# Patient Record
Sex: MALE | Race: WHITE | NOT HISPANIC OR LATINO | URBAN - METROPOLITAN AREA
[De-identification: names, ages, dates, MRNs, and addresses within clinical notes are randomized per-mention and may not be internally consistent; named-entity substitution may affect disease eponyms.]

---

## 2019-08-05 ENCOUNTER — OFFICE (AMBULATORY)
Dept: URBAN - METROPOLITAN AREA CLINIC 64 | Facility: CLINIC | Age: 74
End: 2019-08-05
Payer: COMMERCIAL

## 2019-08-05 VITALS
HEIGHT: 67 IN | WEIGHT: 185 LBS | SYSTOLIC BLOOD PRESSURE: 111 MMHG | HEART RATE: 77 BPM | DIASTOLIC BLOOD PRESSURE: 66 MMHG

## 2019-08-05 DIAGNOSIS — R10.13 EPIGASTRIC PAIN: ICD-10-CM

## 2019-08-05 DIAGNOSIS — R11.0 NAUSEA: ICD-10-CM

## 2019-08-05 DIAGNOSIS — Z86.010 PERSONAL HISTORY OF COLONIC POLYPS: ICD-10-CM

## 2019-08-05 DIAGNOSIS — R10.32 LEFT LOWER QUADRANT PAIN: ICD-10-CM

## 2019-08-05 PROCEDURE — 99204 OFFICE O/P NEW MOD 45 MIN: CPT | Performed by: INTERNAL MEDICINE

## 2019-08-28 ENCOUNTER — LAB REQUISITION (OUTPATIENT)
Dept: LAB | Facility: HOSPITAL | Age: 74
End: 2019-08-28

## 2019-08-28 ENCOUNTER — ON CAMPUS - OUTPATIENT (AMBULATORY)
Dept: URBAN - METROPOLITAN AREA HOSPITAL 2 | Facility: HOSPITAL | Age: 74
End: 2019-08-28
Payer: COMMERCIAL

## 2019-08-28 ENCOUNTER — OFFICE (AMBULATORY)
Dept: URBAN - METROPOLITAN AREA PATHOLOGY 4 | Facility: PATHOLOGY | Age: 74
End: 2019-08-28
Payer: COMMERCIAL

## 2019-08-28 ENCOUNTER — TRANSCRIBE ORDERS (OUTPATIENT)
Dept: ADMINISTRATIVE | Facility: HOSPITAL | Age: 74
End: 2019-08-28

## 2019-08-28 VITALS
DIASTOLIC BLOOD PRESSURE: 62 MMHG | RESPIRATION RATE: 16 BRPM | OXYGEN SATURATION: 99 % | HEART RATE: 81 BPM | HEART RATE: 85 BPM | DIASTOLIC BLOOD PRESSURE: 64 MMHG | SYSTOLIC BLOOD PRESSURE: 146 MMHG | OXYGEN SATURATION: 100 % | OXYGEN SATURATION: 96 % | OXYGEN SATURATION: 98 % | SYSTOLIC BLOOD PRESSURE: 136 MMHG | DIASTOLIC BLOOD PRESSURE: 66 MMHG | DIASTOLIC BLOOD PRESSURE: 82 MMHG | HEART RATE: 80 BPM | WEIGHT: 185 LBS | HEART RATE: 83 BPM | DIASTOLIC BLOOD PRESSURE: 87 MMHG | SYSTOLIC BLOOD PRESSURE: 120 MMHG | SYSTOLIC BLOOD PRESSURE: 149 MMHG | SYSTOLIC BLOOD PRESSURE: 128 MMHG | TEMPERATURE: 97.5 F | DIASTOLIC BLOOD PRESSURE: 71 MMHG | DIASTOLIC BLOOD PRESSURE: 96 MMHG | SYSTOLIC BLOOD PRESSURE: 127 MMHG | HEART RATE: 88 BPM | HEART RATE: 84 BPM | DIASTOLIC BLOOD PRESSURE: 78 MMHG | HEIGHT: 67 IN | SYSTOLIC BLOOD PRESSURE: 157 MMHG | OXYGEN SATURATION: 94 %

## 2019-08-28 DIAGNOSIS — D12.3 BENIGN NEOPLASM OF TRANSVERSE COLON: ICD-10-CM

## 2019-08-28 DIAGNOSIS — R10.13 EPIGASTRIC PAIN: ICD-10-CM

## 2019-08-28 DIAGNOSIS — R10.13 ABDOMINAL PAIN, EPIGASTRIC: Primary | ICD-10-CM

## 2019-08-28 DIAGNOSIS — R11.0 NAUSEA: ICD-10-CM

## 2019-08-28 DIAGNOSIS — Z86.010 PERSONAL HISTORY OF COLONIC POLYPS: ICD-10-CM

## 2019-08-28 DIAGNOSIS — Z86.010 HISTORY OF COLONIC POLYPS: ICD-10-CM

## 2019-08-28 LAB
GI HISTOLOGY: A. UNSPECIFIED: (no result)
GI HISTOLOGY: PDF REPORT: (no result)

## 2019-08-28 PROCEDURE — 88305 TISSUE EXAM BY PATHOLOGIST: CPT | Performed by: INTERNAL MEDICINE

## 2019-08-28 PROCEDURE — 88305 TISSUE EXAM BY PATHOLOGIST: CPT | Mod: 26 | Performed by: INTERNAL MEDICINE

## 2019-08-28 PROCEDURE — 45385 COLONOSCOPY W/LESION REMOVAL: CPT | Performed by: INTERNAL MEDICINE

## 2019-08-28 PROCEDURE — 43235 EGD DIAGNOSTIC BRUSH WASH: CPT | Performed by: INTERNAL MEDICINE

## 2019-08-29 LAB
LAB AP CASE REPORT: NORMAL
PATH REPORT.FINAL DX SPEC: NORMAL
PATH REPORT.GROSS SPEC: NORMAL

## 2019-09-04 ENCOUNTER — HOSPITAL ENCOUNTER (OUTPATIENT)
Dept: NUCLEAR MEDICINE | Facility: HOSPITAL | Age: 74
Discharge: HOME | End: 2019-09-04

## 2019-09-04 ENCOUNTER — HOSPITAL ENCOUNTER (OUTPATIENT)
Dept: ULTRASOUND IMAGING | Facility: HOSPITAL | Age: 74
Discharge: HOME | End: 2019-09-04
Admitting: INTERNAL MEDICINE

## 2019-09-04 DIAGNOSIS — R10.13 ABDOMINAL PAIN, EPIGASTRIC: ICD-10-CM

## 2019-09-04 DIAGNOSIS — R11.0 NAUSEA: ICD-10-CM

## 2019-09-04 PROCEDURE — 0 TECHNETIUM TC 99M MEBROFENIN KIT: Performed by: INTERNAL MEDICINE

## 2019-09-04 PROCEDURE — 78227 HEPATOBIL SYST IMAGE W/DRUG: CPT

## 2019-09-04 PROCEDURE — 76705 ECHO EXAM OF ABDOMEN: CPT

## 2019-09-04 PROCEDURE — A9537 TC99M MEBROFENIN: HCPCS | Performed by: INTERNAL MEDICINE

## 2019-09-04 RX ORDER — KIT FOR THE PREPARATION OF TECHNETIUM TC 99M MEBROFENIN 45 MG/10ML
1 INJECTION, POWDER, LYOPHILIZED, FOR SOLUTION INTRAVENOUS
Status: COMPLETED | OUTPATIENT
Start: 2019-09-04 | End: 2019-09-04

## 2019-09-04 RX ADMIN — MEBROFENIN 1 DOSE: 45 INJECTION, POWDER, LYOPHILIZED, FOR SOLUTION INTRAVENOUS at 07:30

## 2019-09-18 ENCOUNTER — OFFICE (AMBULATORY)
Dept: URBAN - METROPOLITAN AREA CLINIC 64 | Facility: CLINIC | Age: 74
End: 2019-09-18
Payer: COMMERCIAL

## 2019-09-18 VITALS
WEIGHT: 185 LBS | HEART RATE: 83 BPM | HEIGHT: 67 IN | DIASTOLIC BLOOD PRESSURE: 70 MMHG | SYSTOLIC BLOOD PRESSURE: 120 MMHG

## 2019-09-18 DIAGNOSIS — R10.32 LEFT LOWER QUADRANT PAIN: ICD-10-CM

## 2019-09-18 DIAGNOSIS — I10 ESSENTIAL (PRIMARY) HYPERTENSION: ICD-10-CM

## 2019-09-18 DIAGNOSIS — R11.0 NAUSEA: ICD-10-CM

## 2019-09-18 DIAGNOSIS — K59.00 CONSTIPATION, UNSPECIFIED: ICD-10-CM

## 2019-09-18 PROCEDURE — 99214 OFFICE O/P EST MOD 30 MIN: CPT | Performed by: INTERNAL MEDICINE

## 2019-09-18 RX ORDER — VITAMIN A 2400 MCG
500 CAPSULE ORAL
Qty: 60 | Refills: 0 | Status: ACTIVE
Start: 2019-09-18

## 2019-09-18 RX ORDER — PLECANATIDE 3 MG/1
3 TABLET ORAL
Qty: 90 | Refills: 3 | Status: ACTIVE
Start: 2019-09-18

## 2019-09-18 NOTE — SERVICEHPINOTES
ROMAN CALIX is a 74 year old male c hx of cad, chf, cabg and back surgery who is being seen in the office today for nausea as well as intermittent LLQ and epig pain. He was seen by Dr. Bailey and started on abx for presumed diverticulitis. Pain recurred and he had CT suggesting large ventral hernia. He denies wt loss or abd distension. Pain can be sharp and achy and of mod severity without radiation mainly in llq. He has hx of several L lower ribs removed in past prior to back surgery and notes abdominal bulging in the area as well. He was told he is not good candidate to have hernia repaired.  He tends towards constipation and takes fiber and linzess.Since last seen, he had egd/colon, US and HIDA with results below. He continues to have daily nausea and constipation with some episodes of diarrhea. He has early satiety. His wt is stable.BRSept 2019 HIDA 91% no symptomsBRSept 2019 US nl gallbladderBRAug 2019 EGD/colon nl EGD, polypBRJuly 2019 CT large ventral [id number] egd/colon EGD nl, colon TA polyps

## 2019-11-15 ENCOUNTER — OFFICE (AMBULATORY)
Dept: URBAN - METROPOLITAN AREA CLINIC 64 | Facility: CLINIC | Age: 74
End: 2019-11-15
Payer: COMMERCIAL

## 2019-11-15 VITALS
DIASTOLIC BLOOD PRESSURE: 62 MMHG | HEART RATE: 77 BPM | WEIGHT: 186 LBS | HEIGHT: 67 IN | SYSTOLIC BLOOD PRESSURE: 116 MMHG

## 2019-11-15 DIAGNOSIS — R11.0 NAUSEA: ICD-10-CM

## 2019-11-15 DIAGNOSIS — K59.00 CONSTIPATION, UNSPECIFIED: ICD-10-CM

## 2019-11-15 DIAGNOSIS — R10.32 LEFT LOWER QUADRANT PAIN: ICD-10-CM

## 2019-11-15 PROCEDURE — 99213 OFFICE O/P EST LOW 20 MIN: CPT | Performed by: INTERNAL MEDICINE

## 2019-11-15 RX ORDER — LINACLOTIDE 290 UG/1
290 CAPSULE, GELATIN COATED ORAL
Qty: 90 | Refills: 3 | Status: COMPLETED
Start: 2019-11-15 | End: 2020-02-20

## 2019-11-15 RX ORDER — PEPPERMINT OIL 90 MG
180 CAPSULE, DELAYED, AND EXTENDED RELEASE ORAL
Qty: 60 | Refills: 12 | Status: ACTIVE
Start: 2019-11-15

## 2020-02-10 ENCOUNTER — OFFICE (AMBULATORY)
Dept: URBAN - METROPOLITAN AREA CLINIC 64 | Facility: CLINIC | Age: 75
End: 2020-02-10
Payer: COMMERCIAL

## 2020-02-10 VITALS
WEIGHT: 185 LBS | HEIGHT: 67 IN | SYSTOLIC BLOOD PRESSURE: 121 MMHG | DIASTOLIC BLOOD PRESSURE: 61 MMHG | HEART RATE: 79 BPM

## 2020-02-10 DIAGNOSIS — K59.00 CONSTIPATION, UNSPECIFIED: ICD-10-CM

## 2020-02-10 DIAGNOSIS — R63.0 ANOREXIA: ICD-10-CM

## 2020-02-10 DIAGNOSIS — R19.4 CHANGE IN BOWEL HABIT: ICD-10-CM

## 2020-02-10 DIAGNOSIS — R11.0 NAUSEA: ICD-10-CM

## 2020-02-10 PROCEDURE — 99213 OFFICE O/P EST LOW 20 MIN: CPT | Performed by: INTERNAL MEDICINE

## 2020-02-10 RX ORDER — PRUCALOPRIDE 2 MG/1
2 TABLET, FILM COATED ORAL
Qty: 30 | Refills: 11 | Status: ACTIVE
Start: 2020-02-10

## 2021-06-21 ENCOUNTER — INPATIENT HOSPITAL (AMBULATORY)
Dept: URBAN - METROPOLITAN AREA HOSPITAL 76 | Facility: HOSPITAL | Age: 76
End: 2021-06-21

## 2021-06-21 DIAGNOSIS — K21.9 GASTRO-ESOPHAGEAL REFLUX DISEASE WITHOUT ESOPHAGITIS: ICD-10-CM

## 2021-06-21 DIAGNOSIS — K59.00 CONSTIPATION, UNSPECIFIED: ICD-10-CM

## 2021-06-21 DIAGNOSIS — R14.0 ABDOMINAL DISTENSION (GASEOUS): ICD-10-CM

## 2021-06-21 PROCEDURE — 99222 1ST HOSP IP/OBS MODERATE 55: CPT | Performed by: INTERNAL MEDICINE

## 2021-06-22 ENCOUNTER — INPATIENT HOSPITAL (AMBULATORY)
Dept: URBAN - METROPOLITAN AREA HOSPITAL 76 | Facility: HOSPITAL | Age: 76
End: 2021-06-22

## 2021-06-22 DIAGNOSIS — R14.0 ABDOMINAL DISTENSION (GASEOUS): ICD-10-CM

## 2021-06-22 DIAGNOSIS — K21.9 GASTRO-ESOPHAGEAL REFLUX DISEASE WITHOUT ESOPHAGITIS: ICD-10-CM

## 2021-06-22 DIAGNOSIS — K59.00 CONSTIPATION, UNSPECIFIED: ICD-10-CM

## 2021-06-22 PROCEDURE — 99232 SBSQ HOSP IP/OBS MODERATE 35: CPT | Performed by: NURSE PRACTITIONER

## 2021-12-07 ENCOUNTER — APPOINTMENT (OUTPATIENT)
Dept: GENERAL RADIOLOGY | Facility: HOSPITAL | Age: 76
End: 2021-12-07

## 2021-12-07 ENCOUNTER — APPOINTMENT (OUTPATIENT)
Dept: CARDIOLOGY | Facility: HOSPITAL | Age: 76
End: 2021-12-07

## 2021-12-07 ENCOUNTER — HOSPITAL ENCOUNTER (INPATIENT)
Facility: HOSPITAL | Age: 76
LOS: 2 days | Discharge: HOME | End: 2021-12-10
Attending: EMERGENCY MEDICINE | Admitting: HOSPITALIST

## 2021-12-07 DIAGNOSIS — E87.1 HYPONATREMIA: ICD-10-CM

## 2021-12-07 DIAGNOSIS — I25.10 CORONARY ARTERY DISEASE INVOLVING NATIVE CORONARY ARTERY OF NATIVE HEART, UNSPECIFIED WHETHER ANGINA PRESENT: ICD-10-CM

## 2021-12-07 DIAGNOSIS — I50.9 ACUTE ON CHRONIC CONGESTIVE HEART FAILURE, UNSPECIFIED HEART FAILURE TYPE (HCC): Primary | ICD-10-CM

## 2021-12-07 DIAGNOSIS — M79.604 RIGHT LEG PAIN: ICD-10-CM

## 2021-12-07 PROBLEM — M91.10 LEGG-PERTHES DISEASE: Status: ACTIVE | Noted: 2021-12-07

## 2021-12-07 PROBLEM — G89.4 CHRONIC PAIN DISORDER: Status: ACTIVE | Noted: 2017-05-11

## 2021-12-07 PROBLEM — N31.9 NEUROGENIC BLADDER: Status: ACTIVE | Noted: 2021-12-07

## 2021-12-07 PROBLEM — M25.559 HIP PAIN: Status: ACTIVE | Noted: 2018-02-22

## 2021-12-07 PROBLEM — N32.0 BLADDER OUTLET OBSTRUCTION: Status: ACTIVE | Noted: 2021-12-07

## 2021-12-07 PROBLEM — N41.1 CHRONIC PROSTATITIS: Status: ACTIVE | Noted: 2018-05-31

## 2021-12-07 PROBLEM — M19.90 ARTHRITIS: Status: ACTIVE | Noted: 2021-12-07

## 2021-12-07 PROBLEM — K21.9 GASTROESOPHAGEAL REFLUX DISEASE: Status: ACTIVE | Noted: 2017-05-11

## 2021-12-07 PROBLEM — IMO0002 HERNIATION OF INTERVERTEBRAL DISC: Status: ACTIVE | Noted: 2017-06-15

## 2021-12-07 PROBLEM — Z96.642 STATUS POST LEFT HIP REPLACEMENT: Status: ACTIVE | Noted: 2021-12-07

## 2021-12-07 PROBLEM — I10 BENIGN ESSENTIAL HYPERTENSION: Status: ACTIVE | Noted: 2017-05-11

## 2021-12-07 PROBLEM — N20.0 LEFT NEPHROLITHIASIS: Status: ACTIVE | Noted: 2018-06-27

## 2021-12-07 LAB
ALBUMIN SERPL-MCNC: 4 G/DL (ref 3.5–5.2)
ALBUMIN/GLOB SERPL: 1.1 G/DL
ALP SERPL-CCNC: 83 U/L (ref 39–117)
ALT SERPL W P-5'-P-CCNC: 20 U/L (ref 1–41)
ANION GAP SERPL CALCULATED.3IONS-SCNC: 9 MMOL/L (ref 5–15)
AST SERPL-CCNC: 65 U/L (ref 1–40)
BASOPHILS # BLD AUTO: 0 10*3/MM3 (ref 0–0.2)
BASOPHILS NFR BLD AUTO: 0.4 % (ref 0–1.5)
BH CV LOW VAS RIGHT LESSER SAPH VESSEL: 1
BH CV LOWER VASCULAR LEFT COMMON FEMORAL AUGMENT: NORMAL
BH CV LOWER VASCULAR LEFT COMMON FEMORAL COMPETENT: NORMAL
BH CV LOWER VASCULAR LEFT COMMON FEMORAL COMPRESS: NORMAL
BH CV LOWER VASCULAR LEFT COMMON FEMORAL PHASIC: NORMAL
BH CV LOWER VASCULAR LEFT COMMON FEMORAL SPONT: NORMAL
BH CV LOWER VASCULAR LEFT DISTAL FEMORAL COMPRESS: NORMAL
BH CV LOWER VASCULAR LEFT GASTRONEMIUS COMPRESS: NORMAL
BH CV LOWER VASCULAR LEFT GREATER SAPH AK COMPRESS: NORMAL
BH CV LOWER VASCULAR LEFT GREATER SAPH BK COMPRESS: NORMAL
BH CV LOWER VASCULAR LEFT LESSER SAPH COMPRESS: NORMAL
BH CV LOWER VASCULAR LEFT MID FEMORAL AUGMENT: NORMAL
BH CV LOWER VASCULAR LEFT MID FEMORAL COMPETENT: NORMAL
BH CV LOWER VASCULAR LEFT MID FEMORAL COMPRESS: NORMAL
BH CV LOWER VASCULAR LEFT MID FEMORAL PHASIC: NORMAL
BH CV LOWER VASCULAR LEFT MID FEMORAL SPONT: NORMAL
BH CV LOWER VASCULAR LEFT PERONEAL COMPRESS: NORMAL
BH CV LOWER VASCULAR LEFT POPLITEAL AUGMENT: NORMAL
BH CV LOWER VASCULAR LEFT POPLITEAL COMPETENT: NORMAL
BH CV LOWER VASCULAR LEFT POPLITEAL COMPRESS: NORMAL
BH CV LOWER VASCULAR LEFT POPLITEAL PHASIC: NORMAL
BH CV LOWER VASCULAR LEFT POPLITEAL SPONT: NORMAL
BH CV LOWER VASCULAR LEFT POSTERIOR TIBIAL COMPRESS: NORMAL
BH CV LOWER VASCULAR LEFT PROXIMAL FEMORAL COMPRESS: NORMAL
BH CV LOWER VASCULAR LEFT SAPHENOFEMORAL JUNCTION COMPRESS: NORMAL
BH CV LOWER VASCULAR RIGHT COMMON FEMORAL AUGMENT: NORMAL
BH CV LOWER VASCULAR RIGHT COMMON FEMORAL COMPETENT: NORMAL
BH CV LOWER VASCULAR RIGHT COMMON FEMORAL COMPRESS: NORMAL
BH CV LOWER VASCULAR RIGHT COMMON FEMORAL PHASIC: NORMAL
BH CV LOWER VASCULAR RIGHT COMMON FEMORAL SPONT: NORMAL
BH CV LOWER VASCULAR RIGHT DISTAL FEMORAL COMPRESS: NORMAL
BH CV LOWER VASCULAR RIGHT GASTRONEMIUS COMPRESS: NORMAL
BH CV LOWER VASCULAR RIGHT GREATER SAPH AK COMPRESS: NORMAL
BH CV LOWER VASCULAR RIGHT GREATER SAPH BK COMPRESS: NORMAL
BH CV LOWER VASCULAR RIGHT LESSER SAPH COMPRESS: NORMAL
BH CV LOWER VASCULAR RIGHT LESSER SAPH THROMBUS: NORMAL
BH CV LOWER VASCULAR RIGHT MID FEMORAL AUGMENT: NORMAL
BH CV LOWER VASCULAR RIGHT MID FEMORAL COMPETENT: NORMAL
BH CV LOWER VASCULAR RIGHT MID FEMORAL COMPRESS: NORMAL
BH CV LOWER VASCULAR RIGHT MID FEMORAL PHASIC: NORMAL
BH CV LOWER VASCULAR RIGHT MID FEMORAL SPONT: NORMAL
BH CV LOWER VASCULAR RIGHT PERONEAL COMPRESS: NORMAL
BH CV LOWER VASCULAR RIGHT POPLITEAL AUGMENT: NORMAL
BH CV LOWER VASCULAR RIGHT POPLITEAL COMPETENT: NORMAL
BH CV LOWER VASCULAR RIGHT POPLITEAL COMPRESS: NORMAL
BH CV LOWER VASCULAR RIGHT POPLITEAL PHASIC: NORMAL
BH CV LOWER VASCULAR RIGHT POPLITEAL SPONT: NORMAL
BH CV LOWER VASCULAR RIGHT POSTERIOR TIBIAL COMPRESS: NORMAL
BH CV LOWER VASCULAR RIGHT PROXIMAL FEMORAL COMPRESS: NORMAL
BH CV LOWER VASCULAR RIGHT SAPHENOFEMORAL JUNCTION COMPRESS: NORMAL
BILIRUB SERPL-MCNC: 0.4 MG/DL (ref 0–1.2)
BUN SERPL-MCNC: 17 MG/DL (ref 8–23)
BUN/CREAT SERPL: 23 (ref 7–25)
CALCIUM SPEC-SCNC: 8.7 MG/DL (ref 8.6–10.5)
CHLORIDE SERPL-SCNC: 79 MMOL/L (ref 98–107)
CHOLEST SERPL-MCNC: 137 MG/DL (ref 0–200)
CO2 SERPL-SCNC: 32 MMOL/L (ref 22–29)
CREAT SERPL-MCNC: 0.74 MG/DL (ref 0.76–1.27)
DEPRECATED RDW RBC AUTO: 42.9 FL (ref 37–54)
EOSINOPHIL # BLD AUTO: 0.3 10*3/MM3 (ref 0–0.4)
EOSINOPHIL NFR BLD AUTO: 3.6 % (ref 0.3–6.2)
ERYTHROCYTE [DISTWIDTH] IN BLOOD BY AUTOMATED COUNT: 14.8 % (ref 12.3–15.4)
GFR SERPL CREATININE-BSD FRML MDRD: 102 ML/MIN/1.73
GFR SERPL CREATININE-BSD FRML MDRD: 124 ML/MIN/1.73
GLOBULIN UR ELPH-MCNC: 3.5 GM/DL
GLUCOSE SERPL-MCNC: 118 MG/DL (ref 65–99)
HCT VFR BLD AUTO: 34.1 % (ref 37.5–51)
HDLC SERPL-MCNC: 44 MG/DL (ref 40–60)
HGB BLD-MCNC: 12.1 G/DL (ref 13–17.7)
HOLD SPECIMEN: NORMAL
HOLD SPECIMEN: NORMAL
LDLC SERPL CALC-MCNC: 65 MG/DL (ref 0–100)
LDLC/HDLC SERPL: 1.35 {RATIO}
LYMPHOCYTES # BLD AUTO: 1.2 10*3/MM3 (ref 0.7–3.1)
LYMPHOCYTES NFR BLD AUTO: 13.7 % (ref 19.6–45.3)
MAGNESIUM SERPL-MCNC: 2.1 MG/DL (ref 1.6–2.4)
MAXIMAL PREDICTED HEART RATE: 142 BPM
MCH RBC QN AUTO: 28.9 PG (ref 26.6–33)
MCHC RBC AUTO-ENTMCNC: 35.6 G/DL (ref 31.5–35.7)
MCV RBC AUTO: 81.2 FL (ref 79–97)
MONOCYTES # BLD AUTO: 0.6 10*3/MM3 (ref 0.1–0.9)
MONOCYTES NFR BLD AUTO: 6.9 % (ref 5–12)
NEUTROPHILS NFR BLD AUTO: 6.5 10*3/MM3 (ref 1.7–7)
NEUTROPHILS NFR BLD AUTO: 75.4 % (ref 42.7–76)
NRBC BLD AUTO-RTO: 0 /100 WBC (ref 0–0.2)
NT-PROBNP SERPL-MCNC: 492.9 PG/ML (ref 0–1800)
OSMOLALITY SERPL: 253 MOSM/KG (ref 280–301)
PLATELET # BLD AUTO: 172 10*3/MM3 (ref 140–450)
PMV BLD AUTO: 7.2 FL (ref 6–12)
POTASSIUM SERPL-SCNC: 4.7 MMOL/L (ref 3.5–5.2)
PROT SERPL-MCNC: 7.5 G/DL (ref 6–8.5)
RBC # BLD AUTO: 4.19 10*6/MM3 (ref 4.14–5.8)
SODIUM SERPL-SCNC: 120 MMOL/L (ref 136–145)
STRESS TARGET HR: 121 BPM
TRIGL SERPL-MCNC: 167 MG/DL (ref 0–150)
TROPONIN T SERPL-MCNC: 0.01 NG/ML (ref 0–0.03)
VLDLC SERPL-MCNC: 28 MG/DL (ref 5–40)
WBC NRBC COR # BLD: 8.6 10*3/MM3 (ref 3.4–10.8)
WHOLE BLOOD HOLD SPECIMEN: NORMAL

## 2021-12-07 PROCEDURE — 71045 X-RAY EXAM CHEST 1 VIEW: CPT

## 2021-12-07 PROCEDURE — 80053 COMPREHEN METABOLIC PANEL: CPT | Performed by: EMERGENCY MEDICINE

## 2021-12-07 PROCEDURE — 25010000002 ONDANSETRON PER 1 MG: Performed by: EMERGENCY MEDICINE

## 2021-12-07 PROCEDURE — 85025 COMPLETE CBC W/AUTO DIFF WBC: CPT | Performed by: EMERGENCY MEDICINE

## 2021-12-07 PROCEDURE — 80061 LIPID PANEL: CPT | Performed by: NURSE PRACTITIONER

## 2021-12-07 PROCEDURE — 93970 EXTREMITY STUDY: CPT

## 2021-12-07 PROCEDURE — 25010000002 MORPHINE PER 10 MG: Performed by: EMERGENCY MEDICINE

## 2021-12-07 PROCEDURE — 99285 EMERGENCY DEPT VISIT HI MDM: CPT

## 2021-12-07 PROCEDURE — 83735 ASSAY OF MAGNESIUM: CPT | Performed by: EMERGENCY MEDICINE

## 2021-12-07 PROCEDURE — 84484 ASSAY OF TROPONIN QUANT: CPT | Performed by: EMERGENCY MEDICINE

## 2021-12-07 PROCEDURE — 83930 ASSAY OF BLOOD OSMOLALITY: CPT | Performed by: NURSE PRACTITIONER

## 2021-12-07 PROCEDURE — 93005 ELECTROCARDIOGRAM TRACING: CPT | Performed by: EMERGENCY MEDICINE

## 2021-12-07 PROCEDURE — 99222 1ST HOSP IP/OBS MODERATE 55: CPT | Performed by: NURSE PRACTITIONER

## 2021-12-07 PROCEDURE — G0378 HOSPITAL OBSERVATION PER HR: HCPCS

## 2021-12-07 PROCEDURE — 83880 ASSAY OF NATRIURETIC PEPTIDE: CPT | Performed by: EMERGENCY MEDICINE

## 2021-12-07 RX ORDER — SODIUM CHLORIDE 0.9 % (FLUSH) 0.9 %
10 SYRINGE (ML) INJECTION EVERY 12 HOURS SCHEDULED
Status: DISCONTINUED | OUTPATIENT
Start: 2021-12-07 | End: 2021-12-10 | Stop reason: HOSPADM

## 2021-12-07 RX ORDER — ACETAMINOPHEN 650 MG/1
650 SUPPOSITORY RECTAL EVERY 4 HOURS PRN
Status: DISCONTINUED | OUTPATIENT
Start: 2021-12-07 | End: 2021-12-10 | Stop reason: HOSPADM

## 2021-12-07 RX ORDER — ONDANSETRON 2 MG/ML
4 INJECTION INTRAMUSCULAR; INTRAVENOUS EVERY 6 HOURS PRN
Status: DISCONTINUED | OUTPATIENT
Start: 2021-12-07 | End: 2021-12-10 | Stop reason: HOSPADM

## 2021-12-07 RX ORDER — SODIUM CHLORIDE 9 MG/ML
125 INJECTION, SOLUTION INTRAVENOUS CONTINUOUS
Status: DISCONTINUED | OUTPATIENT
Start: 2021-12-08 | End: 2021-12-08

## 2021-12-07 RX ORDER — MAGNESIUM SULFATE 1 G/100ML
1 INJECTION INTRAVENOUS AS NEEDED
Status: DISCONTINUED | OUTPATIENT
Start: 2021-12-07 | End: 2021-12-10 | Stop reason: HOSPADM

## 2021-12-07 RX ORDER — POTASSIUM CHLORIDE 20 MEQ/1
40 TABLET, EXTENDED RELEASE ORAL AS NEEDED
Status: DISCONTINUED | OUTPATIENT
Start: 2021-12-07 | End: 2021-12-10 | Stop reason: HOSPADM

## 2021-12-07 RX ORDER — MORPHINE SULFATE 4 MG/ML
4 INJECTION, SOLUTION INTRAMUSCULAR; INTRAVENOUS ONCE
Status: COMPLETED | OUTPATIENT
Start: 2021-12-07 | End: 2021-12-07

## 2021-12-07 RX ORDER — ALUMINA, MAGNESIA, AND SIMETHICONE 2400; 2400; 240 MG/30ML; MG/30ML; MG/30ML
15 SUSPENSION ORAL EVERY 6 HOURS PRN
Status: DISCONTINUED | OUTPATIENT
Start: 2021-12-07 | End: 2021-12-10 | Stop reason: HOSPADM

## 2021-12-07 RX ORDER — SODIUM CHLORIDE 0.9 % (FLUSH) 0.9 %
10 SYRINGE (ML) INJECTION AS NEEDED
Status: DISCONTINUED | OUTPATIENT
Start: 2021-12-07 | End: 2021-12-10 | Stop reason: HOSPADM

## 2021-12-07 RX ORDER — POTASSIUM CHLORIDE 1.5 G/1.77G
40 POWDER, FOR SOLUTION ORAL AS NEEDED
Status: DISCONTINUED | OUTPATIENT
Start: 2021-12-07 | End: 2021-12-10 | Stop reason: HOSPADM

## 2021-12-07 RX ORDER — SODIUM CHLORIDE 9 MG/ML
50 INJECTION, SOLUTION INTRAVENOUS CONTINUOUS
Status: DISCONTINUED | OUTPATIENT
Start: 2021-12-07 | End: 2021-12-07

## 2021-12-07 RX ORDER — CHOLECALCIFEROL (VITAMIN D3) 125 MCG
5 CAPSULE ORAL NIGHTLY PRN
Status: DISCONTINUED | OUTPATIENT
Start: 2021-12-07 | End: 2021-12-10 | Stop reason: HOSPADM

## 2021-12-07 RX ORDER — ONDANSETRON 2 MG/ML
4 INJECTION INTRAMUSCULAR; INTRAVENOUS ONCE
Status: COMPLETED | OUTPATIENT
Start: 2021-12-07 | End: 2021-12-07

## 2021-12-07 RX ORDER — ONDANSETRON 4 MG/1
4 TABLET, FILM COATED ORAL EVERY 6 HOURS PRN
Status: DISCONTINUED | OUTPATIENT
Start: 2021-12-07 | End: 2021-12-10 | Stop reason: HOSPADM

## 2021-12-07 RX ORDER — NITROGLYCERIN 0.4 MG/1
0.4 TABLET SUBLINGUAL
Status: DISCONTINUED | OUTPATIENT
Start: 2021-12-07 | End: 2021-12-10 | Stop reason: HOSPADM

## 2021-12-07 RX ORDER — MAGNESIUM SULFATE HEPTAHYDRATE 40 MG/ML
2 INJECTION, SOLUTION INTRAVENOUS AS NEEDED
Status: DISCONTINUED | OUTPATIENT
Start: 2021-12-07 | End: 2021-12-10 | Stop reason: HOSPADM

## 2021-12-07 RX ORDER — BUMETANIDE 0.25 MG/ML
2 INJECTION INTRAMUSCULAR; INTRAVENOUS ONCE
Status: COMPLETED | OUTPATIENT
Start: 2021-12-07 | End: 2021-12-07

## 2021-12-07 RX ORDER — ACETAMINOPHEN 325 MG/1
650 TABLET ORAL EVERY 4 HOURS PRN
Status: DISCONTINUED | OUTPATIENT
Start: 2021-12-07 | End: 2021-12-10 | Stop reason: HOSPADM

## 2021-12-07 RX ORDER — VASOPRESSIN 20 U/ML
1 INJECTION PARENTERAL ONCE
Status: DISCONTINUED | OUTPATIENT
Start: 2021-12-07 | End: 2021-12-07

## 2021-12-07 RX ORDER — ACETAMINOPHEN 160 MG/5ML
650 SOLUTION ORAL EVERY 4 HOURS PRN
Status: DISCONTINUED | OUTPATIENT
Start: 2021-12-07 | End: 2021-12-10 | Stop reason: HOSPADM

## 2021-12-07 RX ORDER — CALCIUM CARBONATE 200(500)MG
2 TABLET,CHEWABLE ORAL 2 TIMES DAILY PRN
Status: DISCONTINUED | OUTPATIENT
Start: 2021-12-07 | End: 2021-12-10 | Stop reason: HOSPADM

## 2021-12-07 RX ADMIN — NITROGLYCERIN 1 INCH: 20 OINTMENT TOPICAL at 19:51

## 2021-12-07 RX ADMIN — ONDANSETRON 4 MG: 2 INJECTION INTRAMUSCULAR; INTRAVENOUS at 18:50

## 2021-12-07 RX ADMIN — BUMETANIDE 2 MG: 0.25 INJECTION, SOLUTION INTRAMUSCULAR; INTRAVENOUS at 19:52

## 2021-12-07 RX ADMIN — MORPHINE SULFATE 4 MG: 4 INJECTION INTRAVENOUS at 18:50

## 2021-12-07 RX ADMIN — MORPHINE SULFATE 4 MG: 4 INJECTION INTRAVENOUS at 22:07

## 2021-12-08 ENCOUNTER — APPOINTMENT (OUTPATIENT)
Dept: CARDIOLOGY | Facility: HOSPITAL | Age: 76
End: 2021-12-08

## 2021-12-08 PROBLEM — E87.1 HYPONATREMIA: Status: ACTIVE | Noted: 2021-12-08

## 2021-12-08 PROBLEM — M79.604 RIGHT LEG PAIN: Status: ACTIVE | Noted: 2021-12-08

## 2021-12-08 LAB
ANION GAP SERPL CALCULATED.3IONS-SCNC: 6 MMOL/L (ref 5–15)
ANION GAP SERPL CALCULATED.3IONS-SCNC: 6 MMOL/L (ref 5–15)
BASOPHILS # BLD AUTO: 0 10*3/MM3 (ref 0–0.2)
BASOPHILS NFR BLD AUTO: 0.2 % (ref 0–1.5)
BUN SERPL-MCNC: 12 MG/DL (ref 8–23)
BUN SERPL-MCNC: 15 MG/DL (ref 8–23)
BUN/CREAT SERPL: 16.4 (ref 7–25)
BUN/CREAT SERPL: 20.3 (ref 7–25)
CALCIUM SPEC-SCNC: 8.1 MG/DL (ref 8.6–10.5)
CALCIUM SPEC-SCNC: 8.4 MG/DL (ref 8.6–10.5)
CHLORIDE SERPL-SCNC: 86 MMOL/L (ref 98–107)
CHLORIDE SERPL-SCNC: 88 MMOL/L (ref 98–107)
CHLORIDE UR-SCNC: <20 MMOL/L
CO2 SERPL-SCNC: 32 MMOL/L (ref 22–29)
CO2 SERPL-SCNC: 33 MMOL/L (ref 22–29)
CREAT SERPL-MCNC: 0.73 MG/DL (ref 0.76–1.27)
CREAT SERPL-MCNC: 0.74 MG/DL (ref 0.76–1.27)
DEPRECATED RDW RBC AUTO: 42.4 FL (ref 37–54)
EOSINOPHIL # BLD AUTO: 0.2 10*3/MM3 (ref 0–0.4)
EOSINOPHIL NFR BLD AUTO: 2.9 % (ref 0.3–6.2)
ERYTHROCYTE [DISTWIDTH] IN BLOOD BY AUTOMATED COUNT: 14.7 % (ref 12.3–15.4)
GFR SERPL CREATININE-BSD FRML MDRD: 102 ML/MIN/1.73
GFR SERPL CREATININE-BSD FRML MDRD: 104 ML/MIN/1.73
GFR SERPL CREATININE-BSD FRML MDRD: 124 ML/MIN/1.73
GFR SERPL CREATININE-BSD FRML MDRD: 126 ML/MIN/1.73
GLUCOSE SERPL-MCNC: 122 MG/DL (ref 65–99)
GLUCOSE SERPL-MCNC: 99 MG/DL (ref 65–99)
HBA1C MFR BLD: 5.8 % (ref 3.5–5.6)
HCT VFR BLD AUTO: 33.7 % (ref 37.5–51)
HGB BLD-MCNC: 11.8 G/DL (ref 13–17.7)
LYMPHOCYTES # BLD AUTO: 0.9 10*3/MM3 (ref 0.7–3.1)
LYMPHOCYTES NFR BLD AUTO: 13.8 % (ref 19.6–45.3)
MAGNESIUM SERPL-MCNC: 2.2 MG/DL (ref 1.6–2.4)
MCH RBC QN AUTO: 28.7 PG (ref 26.6–33)
MCHC RBC AUTO-ENTMCNC: 35 G/DL (ref 31.5–35.7)
MCV RBC AUTO: 82 FL (ref 79–97)
MONOCYTES # BLD AUTO: 0.5 10*3/MM3 (ref 0.1–0.9)
MONOCYTES NFR BLD AUTO: 8 % (ref 5–12)
NEUTROPHILS NFR BLD AUTO: 5.1 10*3/MM3 (ref 1.7–7)
NEUTROPHILS NFR BLD AUTO: 75.1 % (ref 42.7–76)
NRBC BLD AUTO-RTO: 0 /100 WBC (ref 0–0.2)
NT-PROBNP SERPL-MCNC: 574.5 PG/ML (ref 0–1800)
NT-PROBNP SERPL-MCNC: 936.4 PG/ML (ref 0–1800)
OSMOLALITY UR: 137 MOSM/KG (ref 300–800)
PLATELET # BLD AUTO: 150 10*3/MM3 (ref 140–450)
PMV BLD AUTO: 7 FL (ref 6–12)
POTASSIUM SERPL-SCNC: 4.7 MMOL/L (ref 3.5–5.2)
POTASSIUM SERPL-SCNC: 4.8 MMOL/L (ref 3.5–5.2)
POTASSIUM UR-SCNC: 14.4 MMOL/L
RBC # BLD AUTO: 4.11 10*6/MM3 (ref 4.14–5.8)
SARS-COV-2 RNA PNL SPEC NAA+PROBE: NOT DETECTED
SODIUM SERPL-SCNC: 125 MMOL/L (ref 136–145)
SODIUM SERPL-SCNC: 126 MMOL/L (ref 136–145)
SODIUM UR-SCNC: <20 MMOL/L
TROPONIN T SERPL-MCNC: 0.01 NG/ML (ref 0–0.03)
WBC NRBC COR # BLD: 6.8 10*3/MM3 (ref 3.4–10.8)

## 2021-12-08 PROCEDURE — 83735 ASSAY OF MAGNESIUM: CPT | Performed by: NURSE PRACTITIONER

## 2021-12-08 PROCEDURE — 83036 HEMOGLOBIN GLYCOSYLATED A1C: CPT | Performed by: NURSE PRACTITIONER

## 2021-12-08 PROCEDURE — 36415 COLL VENOUS BLD VENIPUNCTURE: CPT | Performed by: NURSE PRACTITIONER

## 2021-12-08 PROCEDURE — 0 MORPHINE SULFATE 4 MG/ML SOLUTION: Performed by: NURSE PRACTITIONER

## 2021-12-08 PROCEDURE — 84300 ASSAY OF URINE SODIUM: CPT | Performed by: EMERGENCY MEDICINE

## 2021-12-08 PROCEDURE — 97162 PT EVAL MOD COMPLEX 30 MIN: CPT

## 2021-12-08 PROCEDURE — 83935 ASSAY OF URINE OSMOLALITY: CPT | Performed by: NURSE PRACTITIONER

## 2021-12-08 PROCEDURE — 84588 ASSAY OF VASOPRESSIN: CPT | Performed by: NURSE PRACTITIONER

## 2021-12-08 PROCEDURE — 99999 PR OFFICE/OUTPT VISIT,PROCEDURE ONLY: CPT | Performed by: INTERNAL MEDICINE

## 2021-12-08 PROCEDURE — 97166 OT EVAL MOD COMPLEX 45 MIN: CPT

## 2021-12-08 PROCEDURE — 83880 ASSAY OF NATRIURETIC PEPTIDE: CPT | Performed by: NURSE PRACTITIONER

## 2021-12-08 PROCEDURE — 99232 SBSQ HOSP IP/OBS MODERATE 35: CPT | Performed by: HOSPITALIST

## 2021-12-08 PROCEDURE — 80048 BASIC METABOLIC PNL TOTAL CA: CPT | Performed by: NURSE PRACTITIONER

## 2021-12-08 PROCEDURE — 99222 1ST HOSP IP/OBS MODERATE 55: CPT | Performed by: INTERNAL MEDICINE

## 2021-12-08 PROCEDURE — 85025 COMPLETE CBC W/AUTO DIFF WBC: CPT | Performed by: NURSE PRACTITIONER

## 2021-12-08 PROCEDURE — 25010000002 SULFUR HEXAFLUORIDE MICROSPH 60.7-25 MG RECONSTITUTED SUSPENSION: Performed by: HOSPITALIST

## 2021-12-08 PROCEDURE — 25010000002 ENOXAPARIN PER 10 MG: Performed by: NURSE PRACTITIONER

## 2021-12-08 PROCEDURE — 93306 TTE W/DOPPLER COMPLETE: CPT | Performed by: INTERNAL MEDICINE

## 2021-12-08 PROCEDURE — 84133 ASSAY OF URINE POTASSIUM: CPT | Performed by: EMERGENCY MEDICINE

## 2021-12-08 PROCEDURE — 82436 ASSAY OF URINE CHLORIDE: CPT | Performed by: EMERGENCY MEDICINE

## 2021-12-08 PROCEDURE — U0003 INFECTIOUS AGENT DETECTION BY NUCLEIC ACID (DNA OR RNA); SEVERE ACUTE RESPIRATORY SYNDROME CORONAVIRUS 2 (SARS-COV-2) (CORONAVIRUS DISEASE [COVID-19]), AMPLIFIED PROBE TECHNIQUE, MAKING USE OF HIGH THROUGHPUT TECHNOLOGIES AS DESCRIBED BY CMS-2020-01-R: HCPCS | Performed by: NURSE PRACTITIONER

## 2021-12-08 PROCEDURE — 93306 TTE W/DOPPLER COMPLETE: CPT

## 2021-12-08 PROCEDURE — U0005 INFEC AGEN DETEC AMPLI PROBE: HCPCS | Performed by: NURSE PRACTITIONER

## 2021-12-08 PROCEDURE — 84484 ASSAY OF TROPONIN QUANT: CPT | Performed by: NURSE PRACTITIONER

## 2021-12-08 RX ORDER — CLOPIDOGREL BISULFATE 75 MG/1
75 TABLET ORAL DAILY
Status: CANCELLED | OUTPATIENT
Start: 2021-12-09

## 2021-12-08 RX ORDER — FUROSEMIDE 40 MG/1
20 TABLET ORAL NIGHTLY PRN
COMMUNITY

## 2021-12-08 RX ORDER — BUMETANIDE 0.25 MG/ML
1 INJECTION INTRAMUSCULAR; INTRAVENOUS
Status: DISCONTINUED | OUTPATIENT
Start: 2021-12-08 | End: 2021-12-09

## 2021-12-08 RX ORDER — ONDANSETRON 4 MG/1
4 TABLET, FILM COATED ORAL EVERY 8 HOURS PRN
COMMUNITY

## 2021-12-08 RX ORDER — METOLAZONE 5 MG/1
2.5 TABLET ORAL 3 TIMES WEEKLY
COMMUNITY
End: 2021-12-10 | Stop reason: HOSPADM

## 2021-12-08 RX ORDER — POLYETHYLENE GLYCOL 3350 17 G/17G
17 POWDER, FOR SOLUTION ORAL 3 TIMES DAILY PRN
COMMUNITY

## 2021-12-08 RX ORDER — ALBUTEROL SULFATE 2.5 MG/3ML
2.5 SOLUTION RESPIRATORY (INHALATION) EVERY 8 HOURS PRN
COMMUNITY

## 2021-12-08 RX ORDER — ATENOLOL 25 MG/1
12.5 TABLET ORAL DAILY
COMMUNITY

## 2021-12-08 RX ORDER — FUROSEMIDE 40 MG/1
40 TABLET ORAL 2 TIMES DAILY
Status: ON HOLD | COMMUNITY
End: 2021-12-08

## 2021-12-08 RX ORDER — MORPHINE SULFATE 4 MG/ML
4 INJECTION, SOLUTION INTRAMUSCULAR; INTRAVENOUS ONCE
Status: COMPLETED | OUTPATIENT
Start: 2021-12-08 | End: 2021-12-08

## 2021-12-08 RX ORDER — OXYCODONE AND ACETAMINOPHEN 10; 325 MG/1; MG/1
1 TABLET ORAL 4 TIMES DAILY
Status: ON HOLD | COMMUNITY
End: 2021-12-10 | Stop reason: SDUPTHER

## 2021-12-08 RX ORDER — ROSUVASTATIN CALCIUM 10 MG/1
10 TABLET, COATED ORAL NIGHTLY
Status: DISCONTINUED | OUTPATIENT
Start: 2021-12-08 | End: 2021-12-10 | Stop reason: HOSPADM

## 2021-12-08 RX ORDER — FUROSEMIDE 40 MG/1
40 TABLET ORAL EVERY MORNING
COMMUNITY

## 2021-12-08 RX ORDER — POLYETHYLENE GLYCOL 3350 17 G/17G
17 POWDER, FOR SOLUTION ORAL 3 TIMES DAILY PRN
Status: CANCELLED | OUTPATIENT
Start: 2021-12-08

## 2021-12-08 RX ORDER — GUAIFENESIN 600 MG/1
1200 TABLET, EXTENDED RELEASE ORAL 2 TIMES DAILY
Status: ON HOLD | COMMUNITY
End: 2021-12-08

## 2021-12-08 RX ORDER — BUDESONIDE AND FORMOTEROL FUMARATE DIHYDRATE 160; 4.5 UG/1; UG/1
2 AEROSOL RESPIRATORY (INHALATION) NIGHTLY
COMMUNITY

## 2021-12-08 RX ORDER — ROSUVASTATIN CALCIUM 10 MG/1
10 TABLET, FILM COATED ORAL DAILY
COMMUNITY

## 2021-12-08 RX ORDER — CYCLOBENZAPRINE HCL 10 MG
5 TABLET ORAL 3 TIMES DAILY PRN
Status: CANCELLED | OUTPATIENT
Start: 2021-12-08

## 2021-12-08 RX ORDER — SENNA PLUS 8.6 MG/1
2 TABLET ORAL 2 TIMES DAILY
COMMUNITY

## 2021-12-08 RX ORDER — METOLAZONE 2.5 MG/1
2.5 TABLET ORAL 3 TIMES WEEKLY
Status: CANCELLED | OUTPATIENT
Start: 2021-12-08

## 2021-12-08 RX ORDER — ALBUTEROL SULFATE 2.5 MG/3ML
2.5 SOLUTION RESPIRATORY (INHALATION) EVERY 8 HOURS PRN
Status: CANCELLED | OUTPATIENT
Start: 2021-12-08

## 2021-12-08 RX ORDER — SENNA PLUS 8.6 MG/1
2 TABLET ORAL 2 TIMES DAILY
Status: CANCELLED | OUTPATIENT
Start: 2021-12-08

## 2021-12-08 RX ORDER — ROSUVASTATIN CALCIUM 10 MG/1
10 TABLET, COATED ORAL NIGHTLY
Status: DISCONTINUED | OUTPATIENT
Start: 2021-12-08 | End: 2021-12-08

## 2021-12-08 RX ORDER — BUDESONIDE AND FORMOTEROL FUMARATE DIHYDRATE 160; 4.5 UG/1; UG/1
2 AEROSOL RESPIRATORY (INHALATION) NIGHTLY
Status: CANCELLED | OUTPATIENT
Start: 2021-12-08

## 2021-12-08 RX ORDER — ISOSORBIDE MONONITRATE 60 MG/1
60 TABLET, EXTENDED RELEASE ORAL DAILY
COMMUNITY

## 2021-12-08 RX ORDER — ATENOLOL 25 MG/1
12.5 TABLET ORAL DAILY
Status: CANCELLED | OUTPATIENT
Start: 2021-12-09

## 2021-12-08 RX ORDER — MORPHINE SULFATE 4 MG/ML
2 INJECTION, SOLUTION INTRAMUSCULAR; INTRAVENOUS EVERY 4 HOURS PRN
Status: DISPENSED | OUTPATIENT
Start: 2021-12-08 | End: 2021-12-10

## 2021-12-08 RX ORDER — ISOSORBIDE MONONITRATE 60 MG/1
60 TABLET, EXTENDED RELEASE ORAL DAILY
Status: CANCELLED | OUTPATIENT
Start: 2021-12-09

## 2021-12-08 RX ORDER — CYCLOBENZAPRINE HCL 5 MG
5 TABLET ORAL 3 TIMES DAILY PRN
COMMUNITY

## 2021-12-08 RX ORDER — LISINOPRIL 2.5 MG/1
2.5 TABLET ORAL DAILY
Status: ON HOLD | COMMUNITY
End: 2021-12-08

## 2021-12-08 RX ORDER — MULTIPLE VITAMINS W/ MINERALS TAB 9MG-400MCG
1 TAB ORAL DAILY
Status: CANCELLED | OUTPATIENT
Start: 2021-12-09

## 2021-12-08 RX ORDER — OXYCODONE HYDROCHLORIDE 5 MG/1
10 TABLET ORAL ONCE
Status: COMPLETED | OUTPATIENT
Start: 2021-12-08 | End: 2021-12-08

## 2021-12-08 RX ORDER — CLOPIDOGREL BISULFATE 75 MG/1
75 TABLET ORAL DAILY
COMMUNITY

## 2021-12-08 RX ORDER — PANTOPRAZOLE SODIUM 40 MG/1
40 TABLET, DELAYED RELEASE ORAL DAILY
COMMUNITY

## 2021-12-08 RX ORDER — ROSUVASTATIN CALCIUM 10 MG/1
10 TABLET, COATED ORAL DAILY
Status: CANCELLED | OUTPATIENT
Start: 2021-12-09

## 2021-12-08 RX ORDER — MULTIPLE VITAMINS W/ MINERALS TAB 9MG-400MCG
1 TAB ORAL DAILY
COMMUNITY

## 2021-12-08 RX ORDER — PANTOPRAZOLE SODIUM 40 MG/1
40 TABLET, DELAYED RELEASE ORAL DAILY
Status: CANCELLED | OUTPATIENT
Start: 2021-12-09

## 2021-12-08 RX ADMIN — SODIUM CHLORIDE 500 ML: 9 INJECTION, SOLUTION INTRAVENOUS at 00:49

## 2021-12-08 RX ADMIN — ENOXAPARIN SODIUM 40 MG: 40 INJECTION SUBCUTANEOUS at 00:49

## 2021-12-08 RX ADMIN — MORPHINE SULFATE 2 MG: 4 INJECTION INTRAVENOUS at 19:47

## 2021-12-08 RX ADMIN — OXYCODONE 10 MG: 5 TABLET ORAL at 00:49

## 2021-12-08 RX ADMIN — BUMETANIDE 1 MG: 0.25 INJECTION INTRAMUSCULAR; INTRAVENOUS at 21:17

## 2021-12-08 RX ADMIN — ROSUVASTATIN CALCIUM 10 MG: 10 TABLET, FILM COATED ORAL at 21:17

## 2021-12-08 RX ADMIN — MORPHINE SULFATE 2 MG: 4 INJECTION INTRAVENOUS at 23:40

## 2021-12-08 RX ADMIN — ENOXAPARIN SODIUM 40 MG: 40 INJECTION SUBCUTANEOUS at 15:35

## 2021-12-08 RX ADMIN — SODIUM CHLORIDE, PRESERVATIVE FREE 10 ML: 5 INJECTION INTRAVENOUS at 00:49

## 2021-12-08 RX ADMIN — MORPHINE SULFATE 4 MG: 4 INJECTION INTRAVENOUS at 00:49

## 2021-12-08 RX ADMIN — SODIUM CHLORIDE, PRESERVATIVE FREE 10 ML: 5 INJECTION INTRAVENOUS at 21:17

## 2021-12-08 RX ADMIN — MORPHINE SULFATE 2 MG: 4 INJECTION INTRAVENOUS at 15:35

## 2021-12-08 RX ADMIN — MORPHINE SULFATE 2 MG: 4 INJECTION INTRAVENOUS at 11:15

## 2021-12-08 RX ADMIN — SODIUM CHLORIDE 125 ML/HR: 9 INJECTION, SOLUTION INTRAVENOUS at 00:50

## 2021-12-08 RX ADMIN — SULFUR HEXAFLUORIDE 2 ML: KIT at 07:27

## 2021-12-08 NOTE — ED PROVIDER NOTES
Subjective   76-year-old male complaining of leg swelling and orthopnea.  He states he has had a lot of right-sided radicular type pain and some muscle spasm.  He reports that he has been nauseated and short of breath but denies chest pain other than a chest tightness when he takes a deep breath.  He denies fever or chills.  He reports he took an extra water pill recently because he did have some increased swelling in both legs.  He states he has only had radicular pain on the right.  He reports no night sweats or hemoptysis          Review of Systems   Constitutional: Positive for fatigue. Negative for chills and fever.   Eyes: Negative for visual disturbance.   Respiratory: Positive for chest tightness and shortness of breath. Negative for wheezing and stridor.    Cardiovascular: Positive for leg swelling. Negative for palpitations.   Neurological: Positive for dizziness, weakness and numbness. Negative for tremors, seizures, syncope, facial asymmetry, speech difficulty, light-headedness and headaches.   All other systems reviewed and are negative.      No past medical history on file.  Patient has history of coronary artery disease with previous coronary artery bypass grafting.  The patient has a history of a COPD, hypertension, gastroesophageal reflux disease  Allergies   Allergen Reactions   • Sulfamethoxazole-Trimethoprim Rash   • Morphine And Related GI Intolerance     Nausea  Nausea         No past surgical history on file.    No family history on file.    Social History     Socioeconomic History   • Marital status: Unknown     States that he lives independently    Objective   Physical Exam  Alert Nico Coma Scale 15   HEENT: Pupils equal and reactive to light. Conjunctivae are not injected. normal tympanic membranes. Oropharynx and nares are normal.   Neck: Supple. Midline trachea. No JVD. No goiter.   Chest: Bibasilar rales are noted and equal breath sounds bilaterally regular rate and rhythm without  murmur or rub.  There is no S3   Abdomen: Positive bowel sounds nontender nondistended. No rebound or peritoneal signs. No CVA tenderness.   Extremities no clubbing cyanosis 2+ leg edema is noted motor sensory exam is normal the full range of motion is intact   skin: Warm and dry, no rashes or petechia.   Lymphatic: No regional lymphadenopathy.  The patient has bilateral calf pain but equivocally negative Homans' sign  Procedures           ED Course                 Labs Reviewed   COMPREHENSIVE METABOLIC PANEL - Abnormal; Notable for the following components:       Result Value    Glucose 118 (*)     Creatinine 0.74 (*)     Sodium 120 (*)     Chloride 79 (*)     CO2 32.0 (*)     AST (SGOT) 65 (*)     All other components within normal limits    Narrative:     GFR Normal >60  Chronic Kidney Disease <60  Kidney Failure <15     CBC WITH AUTO DIFFERENTIAL - Abnormal; Notable for the following components:    Hemoglobin 12.1 (*)     Hematocrit 34.1 (*)     Lymphocyte % 13.7 (*)     All other components within normal limits   BNP (IN-HOUSE) - Normal    Narrative:     Among patients with dyspnea, NT-proBNP is highly sensitive for the detection of acute congestive heart failure. In addition NT-proBNP of <300 pg/ml effectively rules out acute congestive heart failure with 99% negative predictive value.    Results may be falsely decreased if patient taking Biotin.     TROPONIN (IN-HOUSE) - Normal    Narrative:     Troponin T Reference Range:  <= 0.03 ng/mL-   Negative for AMI  >0.03 ng/mL-     Abnormal for myocardial necrosis.  Clinicians would have to utilize clinical acumen, EKG, Troponin and serial changes to determine if it is an Acute Myocardial Infarction or myocardial injury due to an underlying chronic condition.       Results may be falsely decreased if patient taking Biotin.     MAGNESIUM - Normal   CBC AND DIFFERENTIAL    Narrative:     The following orders were created for panel order CBC & Differential.  Procedure                                Abnormality         Status                     ---------                               -----------         ------                     CBC Auto Differential[[id number]]        Abnormal            Final result                 Please view results for these tests on the individual orders.   EXTRA TUBES    Narrative:     The following orders were created for panel order Extra Tubes.  Procedure                               Abnormality         Status                     ---------                               -----------         ------                     Gold Top - SST[[id number]]                                   Final result               Green Top (Gel)[[id number]]                                  Final result               Light Blue Top[[id number]]                                   Final result                 Please view results for these tests on the individual orders.   GOLD TOP - SST   GREEN TOP   LIGHT BLUE TOP     Medications   Morphine sulfate (PF) injection 4 mg (has no administration in time range)   nitroglycerin (NITROSTAT) ointment 1 inch (1 inch Topical Given 12/7/21 1951)   bumetanide (BUMEX) injection 2 mg (2 mg Intravenous Given 12/7/21 1952)   ondansetron (ZOFRAN) injection 4 mg (4 mg Intravenous Given 12/7/21 1850)   Morphine sulfate (PF) injection 4 mg (4 mg Intravenous Given 12/7/21 1850)     XR Chest 1 View    Result Date: 12/7/2021  1.Cardiomegaly. 2.Some vascular congestion not excluded. 3.Density left basilar area that could relate to some atelectasis.  Electronically Signed By-Scar Paul MD On:12/7/2021 7:35 PM This report was finalized on [id number] by  Scar Paul MD.                       Been a report of bilateral lower extremity venous Doppler duplex test shows old superficial venous thrombosis on the right with no acute DVT.  Lots of interstitial edema was noted                MDM  Number of Diagnoses or Management Options     Amount and/or Complexity  of Data Reviewed  Clinical lab tests: reviewed  Tests in the radiology section of CPT®: reviewed  Tests in the medicine section of CPT®: reviewed    Risk of Complications, Morbidity, and/or Mortality  Presenting problems: high  Diagnostic procedures: high  Management options: high  General comments: Pain was controlled in the ER patient overall said he felt better with emergency department therapy.  The patient's case was discussed with the hospitalist service.  The patient will be admitted for correction of hyponatremia as well as treatment of congestive heart failure.  The patient was agreeable to this plan of treatment        Final diagnoses:   Acute on chronic congestive heart failure, unspecified heart failure type (HCC)   Hyponatremia   Coronary artery disease involving native coronary artery of native heart, unspecified whether angina present       ED Disposition  ED Disposition     ED Disposition Condition Comment    Decision to Admit            No follow-up provider specified.       Medication List      No changes were made to your prescriptions during this visit.          Bronson Damian MD  12/07/21 [id number]

## 2021-12-08 NOTE — PLAN OF CARE
"Goal Outcome Evaluation:  Plan of Care Reviewed With: patient           Outcome Summary: Pt is 76 male with PMHx of CABG, CHF, back surgery, hypertension who presents with c/o BLE edema and RLE pain. Dx with acute-on-chronic CHF, hyponatremia. Venous doppler RLE (-) acute changes. 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 At baseline, pt reports residing with spouse in a one-level home with 2-3 JULIANA. He typically ambulates with RW, but due to increased weakness has been relying on w/c. Spouse in rehab currently, and states daughter and other friends stopping in to assist with IADLs. Pt lethargic upon OT / PT arrival, and is questionable historian. Pt oriented to person, time, and \"hospital\", though does believe he is at a different local hospital initially. Pt aware he is lethargic and states, \"I just can't wake up\" when attempting to complete cognitive assessment. Bed mobility Max A 1x2. Sits EOB approximately 5 minutes, requiring Min-Mod A for balance. Unable to attempt standing tolerance due to cognitive impairments / impaired balance. Pt appears far below stated baseline, and will benefit from IP rehab at discharge.  " Done 7/9/19

## 2021-12-08 NOTE — CONSULTS
INITIAL CONSULT NOTE      Patient Name: Jacky Judd  : 1945  MRN: [id number]  Primary Care Physician: Reuben Puga MD  Date of admission: 2021    Patient Care Team:  Reuben Puga MD as PCP - General  Amairani Chopra DO (Family Medicine)        Reason for Consult:        hyponatremia  Subjective   History of Present Illness:   Chief Complaint:   Chief Complaint   Patient presents with   • Leg Pain     HISTORY:  Jacky Judd is a 76 y.o. male with past medical history of coronary artery bypass graft, congestive heart failure, back surgery, hypertension, bilateral lower extremity edema in the past, was seen by a primary care provider recently has significant edema and swelling of the lower extremities and shortness of breath. Started on metolazone*. who presents with increasing cramps of the lower extremities increased weakness unable to stand up found to have sodium of 120 at the time of admission. Chest x-ray did show some congestion in the both lungs. BNP level was elevated chest x-ray did show cardiomegaly with some vascular congestion still has bilateral lower extremity edema. 2 mg of Bumex given yesterday with some improvement in the urine output and sodium level is getting better. Serum sodium now 126 with urine sodium of 120.          Review of systems:  All other review of system unremarkable  Constitutional: No fever, no chills, no lethargy, no weakness.  HEENT:  No headache, otalgia, itchy eyes, nasal discharge or sore throat.  Cardiac:  No chest pain, dyspnea, orthopnea or PND.  Chest:              No cough, phlegm or wheezing.  Abdomen:  No abdominal pain, nausea or vomiting.  Neuro:  No focal weakness, abnormal movements orseizure like activity.  :   No hematuria, no pyuria, no dysuria, no flank pain.  ROS was otherwise negative except as mentioned in the [place].       Personal History:     Past Medical History:   Past Medical History:   Diagnosis Date   • CHF (congestive  heart failure) ([facility]ly [facility])    • COPD (chronic obstructive pulmonary disease) ([facility]ly [facility])    • Coronary artery disease    • GERD (gastroesophageal reflux disease)    • Hypertension        Surgical History:      Past Surgical History:   Procedure Laterality Date   • CARDIAC SURGERY         Family History: family history is not on file. Otherwise pertinent FHx was reviewed and unremarkable.     Social History:  reports that he has never smoked. He does not have any smokeless tobacco history on file. He reports that he does not drink alcohol and does not use drugs.    Medications:  Prior to Admission medications    Medication Sig Start Date End Date Taking? Authorizing Provider   albuterol (PROVENTIL) (2.5 MG/3ML) 0.083% nebulizer solution Take 2.5 mg by nebulization Every 8 (Eight) Hours As Needed for Wheezing.   Yes Edwardo Pineda MD   atenolol (TENORMIN) 25 MG tablet Take 12.5 mg by mouth Daily.   Yes Edwardo Pineda MD   budesonide-formoterol (SYMBICORT) 160-4.5 MCG/ACT inhaler Inhale 2 puffs Every Night.   Yes Edwardo Pineda MD   Cholecalciferol (Vitamin D3) 25 MCG (1000 UT) capsule Take 1,000 Units by mouth Daily.   Yes Edwardo Pineda MD   clopidogrel (PLAVIX) 75 MG tablet Take 75 mg by mouth Daily.   Yes Edwardo Pineda MD   Crestor 10 MG tablet Take 10 mg by mouth Daily.   Yes Edwardo Pineda MD   cyclobenzaprine (FLEXERIL) 5 MG tablet Take 5 mg by mouth 3 (Three) Times a Day As Needed for Muscle Spasms.   Yes Edwardo Pineda MD   Fluticasone Furoate-Vilanterol (Breo Ellipta) 100-25 MCG/INH inhaler Inhale 1 puff Daily.   Yes Edwardo Pineda MD   isosorbide mononitrate (IMDUR) 60 MG 24 hr tablet Take 60 mg by mouth Daily.   Yes Edwardo Pineda MD   metOLazone (ZAROXOLYN) 5 MG tablet Take 2.5 mg by mouth Daily.   Yes Edwardo Pineda MD   multivitamin with minerals tablet tablet Take 1 tablet by mouth Daily.   Yes Edwardo Pineda MD   NON FORMULARY 4 (Four)  Times a Day As Needed. Rikki/Ailyn lotion compound   Yes Edwardo Pineda MD   ondansetron (ZOFRAN) 4 MG tablet Take 4 mg by mouth Every 8 (Eight) Hours As Needed for Nausea or Vomiting.   Yes Edwardo Pineda MD   oxyCODONE-acetaminophen (PERCOCET)  MG per tablet Take 1 tablet by mouth 4 (Four) Times a Day.   Yes Edwardo Pineda MD   pantoprazole (PROTONIX) 40 MG EC tablet Take 40 mg by mouth Daily.   Yes Edwardo Pineda MD   polyethylene glycol (MiraLax) 17 g packet Take 17 g by mouth 3 (Three) Times a Day As Needed.   Yes Edwardo Pineda MD   senna (senna) 8.6 MG tablet Take 2 tablets by mouth 2 (Two) Times a Day.   Yes Edwardo Pineda MD   Diclofenac Sodium (VOLTAREN) 1 % gel gel Apply 4 g topically to the appropriate area as directed 4 (Four) Times a Day As Needed.  12/8/21  Edwardo Pineda MD   Fluticasone-Umeclidin-Vilant (Trelegy Ellipta) 100-62.5-25 MCG/INH inhaler Inhale 1 puff Daily.  12/8/21  Edwardo Pineda MD   furosemide (LASIX) 40 MG tablet Take 40 mg by mouth 2 (Two) Times a Day.  12/8/21  Edwardo Pineda MD   guaiFENesin (MUCINEX) 600 MG 12 hr tablet Take 1,200 mg by mouth 2 (Two) Times a Day.  12/8/21  Edwardo Pineda MD   lisinopril (PRINIVIL,ZESTRIL) 2.5 MG tablet Take 2.5 mg by mouth Daily.  12/8/21  Edwardo Pineda MD     Scheduled Meds:bumetanide, 1 mg, Intravenous, Q12H  enoxaparin, 40 mg, Subcutaneous, Daily  sodium chloride, 10 mL, Intravenous, Q12H      Continuous Infusions:   PRN Meds:•  acetaminophen **OR** acetaminophen **OR** acetaminophen  •  aluminum-magnesium hydroxide-simethicone  •  calcium carbonate  •  magnesium sulfate **OR** magnesium sulfate in D5W 1g/100mL (PREMIX)  •  melatonin  •  Morphine  •  nitroglycerin  •  ondansetron **OR** ondansetron  •  potassium chloride  •  potassium chloride  •  sodium chloride  Allergies:    Allergies   Allergen Reactions   • Sulfamethoxazole-Trimethoprim Rash   • Morphine  And Related GI Intolerance     Nausea  Nausea         Objective   Exam:     Vital Signs  Temp:  [97.7 °F (36.5 °C)-98.2 °F (36.8 °C)] 98.2 °F (36.8 °C)  Heart Rate:  [] 80  Resp:  [16-17] 17  BP: ()/(53-71) 135/70  SpO2:  [87 %-98 %] 98 %  on  Flow (L/min):  [2] 2;   Device (Oxygen Therapy): nasal cannula  Body mass index is 28.19 kg/m².  EXAM  General: Elderly obese  male in no acute distress.    Head:      Normocephalic and atraumatic.    Eyes:      PERRL/EOM intact, conjunctivae and sclerae clear without nystagmus.    Neck:      No masses, thyromegaly,  trachea central   Lungs:    Clear bilaterally to auscultation.    Heart:      Regular rate and rhythm, no murmur no gallop  Abd:        Soft, nontender, not distended, bowel sounds positive, no shifting dullness.  Msk:        No deformity or scoliosis noted of thoracic or lumbar spine.    Pulses:   Pulses normal in all 4 extremities.    Extremities:        No cyanosis or clubbing--+2 edema.    Neuro:    No focal deficits.   alert oriented x3  Skin:       Intact without lesions or rashes.    Psych:    Alert and cooperative; normal mood and affect; normal attention span       Results Review:  I have personally reviewed most recent Data :  [facility]  CBC    Results from last 7 days   Lab Units 12/08/21  0609 12/07/21  1846   WBC 10*3/mm3 6.80 8.60   HEMOGLOBIN g/dL 11.8* 12.1*   PLATELETS 10*3/mm3 150 172     CMP   Results from last 7 days   Lab Units 12/08/21  1636 12/08/21  0609 12/07/21  1846   SODIUM mmol/L 126* 125* 120*   POTASSIUM mmol/L 4.8 4.7 4.7   CHLORIDE mmol/L 88* 86* 79*   CO2 mmol/L 32.0* 33.0* 32.0*   BUN mg/dL 12 15 17   CREATININE mg/dL 0.73* 0.74* 0.74*   GLUCOSE mg/dL 122* 99 118*   ALBUMIN g/dL  --   --  4.00   BILIRUBIN mg/dL  --   --  0.4   ALK PHOS U/L  --   --  83   AST (SGOT) U/L  --   --  65*   ALT (SGPT) U/L  --   --  20     ABG      XR Chest 1 View    Result Date: 12/7/2021  1.Cardiomegaly. 2.Some vascular  congestion not excluded. 3.Density left basilar area that could relate to some atelectasis.  Electronically Signed By-Scar Paul MD On:12/7/2021 7:35 PM This report was finalized on [id number] by  Scar Paul MD.            Assessment/Plan   Assessment and Plan:         Hyponatremia    Acute on chronic congestive heart failure (HCC)    Benign essential hypertension    Bladder outlet obstruction    CHF (congestive heart failure) (HCC)    Coronary arteriosclerosis in native artery    Gastroesophageal reflux disease    Hip pain    Hypercholesterolemia    Low back pain    S/P CABG x 4    Right leg pain    ASSESSMENT:  • Hyponatremia: Likely etiology use of thiazide diuretics in the presence of some underlying lung condition may be some ADH effect.  • History of coronary artery disease with coronary artery bypass graft done  • Congestive heart failure echocardiogram results pending  • History of hypertension  • History of prostate issues        Plan:     • At this time continue loop diuretics that will help with underlying volume condition and also going to help with the sodium.  • So far creatinine is stable.  • Follow-up with repeat labs tomorrow morning  • As urine sodium is less than 20 I do think thiazide diuretics most likely cause significant hyponatremia  • Thank you for letting me participate  • We will try to manage with loop diuretics and if needed depending upon the echocardiogram Aldactone can be considered avoid thiazide diuretics in the future    Note started  by Barber Barton MD,   [facility] kidney consultant  [phone]

## 2021-12-08 NOTE — THERAPY EVALUATION
Patient Name: Jacky Judd  : 1945    MRN: [id number]                              Today's Date: 2021       Admit Date: 2021    Visit Dx:     ICD-10-CM ICD-9-CM   1. Acute on chronic congestive heart failure, unspecified heart failure type ([facility])  I50.9 428.0   2. Hyponatremia  E87.1 276.1   3. Coronary artery disease involving native coronary artery of native heart, unspecified whether angina present  I25.10 414.01     Patient Active Problem List   Diagnosis   • Acute on chronic congestive heart failure ([facility])   • Arthritis   • Benign essential hypertension   • Bladder outlet obstruction   • CHF (congestive heart failure) ([facility])   • Chronic pain disorder   • Chronic prostatitis   • Coronary arteriosclerosis in native artery   • Gastroesophageal reflux disease   • Herniation of intervertebral disc   • Hip pain   • Hypercholesterolemia   • Left nephrolithiasis   • Legg-Perthes disease   • Low back pain   • Neurogenic bladder   • Presence of stent in left circumflex coronary artery   • Recurrent urinary tract infection   • S/P CABG x 4   • Status post left hip replacement   • Umbilical hernia without obstruction and without gangrene   • Hyponatremia   • Right leg pain     Past Medical History:   Diagnosis Date   • CHF (congestive heart failure) ([facility])    • COPD (chronic obstructive pulmonary disease) ([facility])    • Coronary artery disease    • GERD (gastroesophageal reflux disease)    • Hypertension      Past Surgical History:   Procedure Laterality Date   • CARDIAC SURGERY        General Information     Row Name 21          OT Time and Intention    Document Type evaluation  -ES     Mode of Treatment occupational therapy  -ES     Row Name 21          General Information    Prior Level of Function independent:; ADL's  Questionable historian  -ES     Existing Precautions/Restrictions fall  -ES     Barriers to Rehab medically complex  -ES     Row Name 21 09          Occupational  "Profile    Reason for Services/Referral (Occupational Profile) Pt is 76 male with PMHx of CABG, CHF, back surgery, hypertension who presents with c/o BLE edema and RLE pain. Dx with acute-on-chronic CHF, hyponatremia. Venous doppler RLE (-) acute changes. 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 At baseline, pt reports residing with spouse in a one-level home with 2-3 JULIANA. He typically ambulates with RW, but due to increased weakness has been relying on w/c. Spouse in rehab currently, and states daughter and other friends stopping in to assist with IADLs. Pt lethargic upon OT / PT arrival, and is questionable historian.  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  -ES     Row Name 12/08/21 0923          Safety Issues, Functional Mobility    Impairments Affecting Function (Mobility) balance; cognition; coordination; motor control; motor planning; muscle tone abnormal; pain; postural/trunk control; range of motion (ROM)  -ES     Cognitive Impairments, Mobility Safety/Performance attention; insight into deficits/self-awareness; awareness, need for assistance; problem-solving/reasoning; safety precaution awareness; safety precaution follow-through  -ES           User Key  (r) = Recorded By, (t) = Taken By, (c) = Cosigned By    Initials Name Provider Type    ES Susana Dao OT Occupational Therapist                 Mobility/ADL's     Row Name " 12/08/21 1037          Bed Mobility    Bed Mobility bed mobility (all) activities  -ES     All Activities, Bath (Bed Mobility) maximum assist (25% patient effort); 2 person assist  -ES     Comment (Bed Mobility) Max A x 1-2 secondary to limited hip flexion, RLE pain, and difficulty following verbal / visual / tactile cues.  -ES     Row Name 12/08/21 1037          Transfers    Comment (Transfers) Unable to attempt secondary to difficulty maintaining balance EOB.  -ES     Row Name 12/08/21 1037          Activities of Daily Living    BADL Assessment/Intervention upper body dressing; lower body dressing  -ES     Row Name 12/08/21 1037          Upper Body Dressing Assessment/Training    Bath Level (Upper Body Dressing) moderate assist (50% patient effort)  -ES     Row Name 12/08/21 1037          Lower Body Dressing Assessment/Training    Bath Level (Lower Body Dressing) dependent (less than 25% patient effort)  -ES           User Key  (r) = Recorded By, (t) = Taken By, (c) = Cosigned By    Initials Name Provider Type     Susana Dao OT Occupational Therapist               Obj/Interventions     Row Name 12/08/21 1042          Sensory Assessment (Somatosensory)    Sensory Assessment (Somatosensory) other (see comments)  states chronic neuropathy  -ES     Row Name 12/08/21 1042          Vision Assessment/Intervention    Visual Impairment/Limitations corrective lenses full-time  -ES     Row Name 12/08/21 1042          Range of Motion Comprehensive    General Range of Motion bilateral upper extremity ROM WFL  -ES     Comment, General Range of Motion BUE ROM. Assessment limited due to cognitive limitations.  -ES     Row Name 12/08/21 1042          Strength Comprehensive (MMT)    Comment, General Manual Muscle Testing (MMT) Assessment Grossly 3/5 within ROM. Limited by cognitive assessment.  -ES     Row Name 12/08/21 1042          Motor Skills    Motor Skills coordination; neuro-muscular  function; posture  -ES     Row Name 12/08/21 1042          Balance    Balance Assessment sitting static balance; sitting dynamic balance  -ES     Static Sitting Balance mild impairment  -ES     Dynamic Sitting Balance mild impairment; moderate impairment  -ES     Balance Interventions sitting  -ES           User Key  (r) = Recorded By, (t) = Taken By, (c) = Cosigned By    Initials Name Provider Type    Susana Mclaughlin OT Occupational Therapist               Goals/Plan     Row Name 12/08/21 1102          Bathing Goal 1 (OT)    Activity/Device (Bathing Goal 1, OT) bathing skills, all  -ES     [place] Level/Cues Needed (Bathing Goal 1, OT) moderate assist (50-74% patient effort)  -ES     Time Frame (Bathing Goal 1, OT) 2 weeks  -ES     Row Name 12/08/21 1102          Dressing Goal 1 (OT)    Activity/Device (Dressing Goal 1, OT) lower body dressing  -ES     [place]/Cues Needed (Dressing Goal 1, OT) moderate assist (50-74% patient effort)  -ES     Time Frame (Dressing Goal 1, OT) 2 weeks  -ES     Row Name 12/08/21 1102          Toileting Goal 1 (OT)    Activity/Device (Toileting Goal 1, OT) toileting skills, all  -ES     [place] Level/Cues Needed (Toileting Goal 1, OT) moderate assist (50-74% patient effort)  -ES     Time Frame (Toileting Goal 1, OT) 2 weeks  -ES     Row Name 12/08/21 1102          Therapy Assessment/Plan (OT)    Planned Therapy Interventions (OT) activity tolerance training; BADL retraining; functional balance retraining; neuromuscular control/coordination retraining; occupation/activity based interventions; passive ROM/stretching; patient/caregiver education/training; ROM/therapeutic exercise; strengthening exercise  -ES           User Key  (r) = Recorded By, (t) = Taken By, (c) = Cosigned By    Initials Name Provider Type    Susana Mclaughlin OT Occupational Therapist               Clinical Impression     Row Name 12/08/21 1056          Pain Assessment    Additional  "Documentation Pain Scale: FACES Pre/Post-Treatment (Group)  -ES     Row Name 12/08/21 1056          Pain Scale: FACES Pre/Post-Treatment    Pain: FACES Scale, Pretreatment 4-->hurts little more  -ES     Posttreatment Pain Rating 6-->hurts even more  -ES     Pain Location - Side Right  -ES     Pain Location - Orientation lower  -ES     Pain Location back; extremity  -ES     Row Name 12/08/21 1056          Plan of Care Review    Plan of Care Reviewed With patient  -ES     Outcome Summary Pt is 76 male with PMHx of CABG, CHF, back surgery, hypertension who presents with c/o BLE edema and RLE pain. Dx with acute-on-chronic CHF, hyponatremia. Venous doppler RLE (-) acute changes. 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 At baseline, pt reports residing with spouse in a one-level home with 2-3 JULIANA. He typically ambulates with RW, but due to increased weakness has been relying on w/c. Spouse in rehab currently, and states daughter and other friends stopping in to assist with IADLs. Pt lethargic upon OT / PT arrival, and is questionable historian. Pt oriented to person, time, and \"hospital\", though does believe he is at a different local hospital initially. Pt aware he is lethargic and states, \"I just can't wake up\" when attempting to complete cognitive assessment. Bed mobility Max A 1x2. Sits EOB approximately 5 minutes, requiring Min-Mod A for balance. Unable to attempt standing tolerance due to cognitive impairments / impaired balance. Pt appears far below stated baseline, and will benefit from IP rehab at discharge.  -ES     Row Name 12/08/21 1056          Therapy Assessment/Plan (OT)    Rehab Potential (OT) good, to achieve stated therapy goals  -ES     Criteria for Skilled Therapeutic Interventions Met (OT) yes  -ES     Therapy Frequency (OT) 5 times/wk  -ES     Row Name 12/08/21 1056          Therapy Plan Review/Discharge Plan (OT)    Anticipated Discharge Disposition (OT) inpatient rehabilitation " facility  -ES     Row Name 12/08/21 1056          Vital Signs    Pre Systolic BP Rehab 123  -ES     Pre Treatment Diastolic BP 42  -ES     Intra Systolic BP Rehab 120  -ES     Intra Treatment Diastolic BP 60  -ES     Post Systolic BP Rehab 106  -ES     Post Treatment Diastolic BP 60  -ES     Pretreatment Heart Rate (beats/min) 100  -ES     Intratreatment Heart Rate (beats/min) 107  -ES     Posttreatment Heart Rate (beats/min) 105  -ES     Pre SpO2 (%) 96  -ES     O2 Delivery Pre Treatment supplemental O2  1L  -ES     Intra SpO2 (%) 88  -ES     O2 Delivery Intra Treatment supplemental O2  -ES     Post SpO2 (%) 95  -ES     O2 Delivery Post Treatment supplemental O2  -ES     Pre Patient Position Supine  -ES     Intra Patient Position Sitting  -ES     Post Patient Position Supine  -ES     Row Name 12/08/21 1056          Positioning and Restraints    Pre-Treatment Position in bed  -ES     Post Treatment Position bed  -ES     In Bed notified nsg; call light within reach; encouraged to call for assist; exit alarm on  -ES           User Key  (r) = Recorded By, (t) = Taken By, (c) = Cosigned By    Initials Name Provider Type    Susana Mclaughlin OT Occupational Therapist               Outcome Measures     Row Name 12/08/21 1103          How much help from another is currently needed...    Putting on and taking off regular lower body clothing? 1  -ES     Bathing (including washing, rinsing, and drying) 2  -ES     Toileting (which includes using toilet bed pan or urinal) 2  -ES     Putting on and taking off regular upper body clothing 2  -ES     Taking care of personal grooming (such as brushing teeth) 2  -ES     Eating meals 3  -ES     AM-PAC 6 Clicks Score (OT) 12  -ES     Row Name 12/08/21 1103          Functional Assessment    Outcome Measure Options AM-PAC 6 Clicks Daily Activity (OT)  -ES           User Key  (r) = Recorded By, (t) = Taken By, (c) = Cosigned By    Initials Name Provider Type    Susana Mclaughlin  "OT Occupational Therapist                  OT Recommendation and Plan  Planned Therapy Interventions (OT): activity tolerance training, BADL retraining, functional balance retraining, neuromuscular control/coordination retraining, occupation/activity based interventions, passive ROM/stretching, patient/caregiver education/training, ROM/therapeutic exercise, strengthening exercise  Therapy Frequency (OT): 5 times/wk  Plan of Care Review  Plan of Care Reviewed With: patient  Outcome Summary: Pt is 76 male with PMHx of CABG, CHF, back surgery, hypertension who presents with c/o BLE edema and RLE pain. Dx with acute-on-chronic CHF, hyponatremia. Venous doppler RLE (-) acute changes. 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 At baseline, pt reports residing with spouse in a one-level home with 2-3 JULIANA. He typically ambulates with RW, but due to increased weakness has been relying on w/c. Spouse in rehab currently, and states daughter and other friends stopping in to assist with IADLs. Pt lethargic upon OT / PT arrival, and is questionable historian. Pt oriented to person, time, and \"hospital\", though does believe he is at a different local hospital initially. Pt aware he is lethargic and states, \"I just can't wake up\" when attempting to complete cognitive assessment. Bed mobility Max A 1x2. Sits EOB approximately 5 minutes, requiring Min-Mod A for balance. Unable to attempt standing tolerance due to cognitive impairments / impaired balance. Pt appears far below stated baseline, and will benefit from IP rehab at discharge.     Time Calculation:    Time Calculation- OT     Row Name 12/08/21 1104             Time Calculation- OT    OT Start Time 0923  -ES      OT Stop Time 0934  -ES      OT Time Calculation (min) 11 min  -ES      Total Timed Code Minutes- OT 0 minute(s)  -ES      OT Received On 12/08/21  -ES      OT - Next Appointment 12/09/21  -ES      OT Goal Re-Cert Due Date 12/22/21  -ES            " User Key  (r) = Recorded By, (t) = Taken By, (c) = Cosigned By    Initials Name Provider Type     Susana Dao OT Occupational Therapist              Therapy Charges for Today     Code Description Service Date Service Provider Modifiers Qty    [id number]  OT EVAL MOD COMPLEXITY 3 12/8/2021 Susana Dao OT GO 1               Susana Dao OT  12/8/2021

## 2021-12-08 NOTE — PLAN OF CARE
Goal Outcome Evaluation:  Plan of Care Reviewed With: patient           Outcome Summary: Pt is a 77 YO M admitted with with BLE edema, and RLE pain. Pt states he lives at home with spouse, however she is currently in rehab. Pt typically independent with ADLs and ambulation, but has recently been transferring self to w/c. Pt states last few days he has been unable to transfer self. Pt this date requires MAX A x2 to come to sitting EOB and assistance to maintain seated balance. Pt with slight confusion, and attributes to lethargy. Pt with decreased sitting tolerance and requested to be returned to supine after approx 5 min. Pt is well below functional baseline and will require IP rehab following d/c.

## 2021-12-08 NOTE — PROGRESS NOTES
[facility] Medicine Services Daily Progress Note    Patient Name: Jacky Judd  : 1945  MRN: [id number]  Primary Care Physician:  Reuben Puga MD  Date of admission: 2021      Subjective      Chief Complaint: Leg swelling      Patient Reports     2021: Complains of lower extremity edema.  Poor historian.  Cardiology consulted.  DC IV fluids because sodium correcting.      Review of Systems   All other systems reviewed and are negative.        Objective      Vitals:   Temp:  [97.7 °F (36.5 °C)-98.2 °F (36.8 °C)] 98.2 °F (36.8 °C)  Heart Rate:  [] 80  Resp:  [16-19] 17  BP: ()/(53-71) 135/70  Flow (L/min):  [2] 2    Physical Exam  HENT:      Head: Normocephalic.      Mouth/Throat:      Mouth: Mucous membranes are moist.   Eyes:      General: No scleral icterus.     Extraocular Movements: Extraocular movements intact.      Pupils: Pupils are equal, round, and reactive to light.   Cardiovascular:      Rate and Rhythm: Normal rate and regular rhythm.   Pulmonary:      Effort: Pulmonary effort is normal.      Breath sounds: Examination of the right-lower field reveals rales. Examination of the left-lower field reveals rales. Rales present.   Abdominal:      General: Bowel sounds are normal.      Tenderness: There is no abdominal tenderness.   Musculoskeletal:      Cervical back: Normal range of motion.      Comments: Bilateral lower extremity pitting edema from toes to hips   Lymphadenopathy:      Cervical: No cervical adenopathy.   Skin:     General: Skin is warm.      Findings: No rash.   Neurological:      Mental Status: He is alert and oriented to person, place, and time.   Psychiatric:         Speech: Speech normal.         Behavior: Behavior is cooperative.         Thought Content: Thought content normal.          Result Review    Result Review:  I have personally reviewed the results from the time of this admission to 2021 15:38 EST and agree with  these findings:  [x]  Laboratory  []  Microbiology  [x]  Radiology  []  EKG/Telemetry   []  Cardiology/Vascular   []  Pathology  [x]  Old records  []  Other:            Assessment/Plan      Brief Patient Summary:  76-year-old male with history of CABG and presentation with lower extremity edema and hyponatremia      enoxaparin, 40 mg, Subcutaneous, Daily  sodium chloride, 10 mL, Intravenous, Q12H             Active Hospital Problems:  Active Hospital Problems    Diagnosis    • **Hyponatremia    • Right leg pain    • CHF (congestive heart failure) ([facility])    • Bladder outlet obstruction      Formatting of this note might be different from the original.   bladder outlet obstruction 65/14 and put residual volume 400 mL     • Acute on chronic congestive heart failure (H[facility])    • Hip pain    • Benign essential hypertension    • Coronary arteriosclerosis in native artery      Formatting of this note might be different from the original.  4/12  cath occluded vein grafts R CA&debo 90obtuse mar LAD&1st diag grafts open     • Gastroesophageal reflux disease    • S/P CABG x 4    • Low back pain    • Hypercholesterolemia      Suspected hypovolemic hyponatremia:  -Improved with IV fluids in the ED  -Avoid rapid sodium correction  -Patient on Lasix at home  -Consult nephrology    Bilateral lower extremity edema:   -Bilateral venous duplex ruled out  -TTE ordered  -Cardiology consulted    Benign essential hypertension:  -Continue lisinopril, Imdur     CAD s/p CABG:  -Cardiology consulted     Gastroesophageal reflux disease:  -Continue Protonix     Low back pain/Hip pain:  -On chronic Percocet at home              DVT prophylaxis:  Medical DVT prophylaxis orders are present.    CODE STATUS:    Medical Intervention Limits: NO intubation (DNI)  Level Of Support Discussed With: Patient  Code Status (Patient has no pulse and is not breathing): No CPR (Do Not Attempt to Resuscitate)  Medical Interventions (Patient has pulse or is  breathing): Limited Support      Disposition:  I expect patient to be discharged defer.    This patient has been examined wearing appropriate Personal Protective Equipment and discussed with nuring. 12/08/21      Electronically signed by Thee Reilly DO, 12/08/21, 15:38 EST.  [facility] Hospitalist [facility]

## 2021-12-08 NOTE — THERAPY EVALUATION
Patient Name: Jacky Judd  : 1945    MRN: [id number]                              Today's Date: 2021       Admit Date: 2021    Visit Dx:     ICD-10-CM ICD-9-CM   1. Acute on chronic congestive heart failure, unspecified heart failure type ([facility])  I50.9 428.0   2. Hyponatremia  E87.1 276.1   3. Coronary artery disease involving native coronary artery of native heart, unspecified whether angina present  I25.10 414.01     Patient Active Problem List   Diagnosis   • Acute on chronic congestive heart failure ([facility])   • Arthritis   • Benign essential hypertension   • Bladder outlet obstruction   • CHF (congestive heart failure) ([facility])   • Chronic pain disorder   • Chronic prostatitis   • Coronary arteriosclerosis in native artery   • Gastroesophageal reflux disease   • Herniation of intervertebral disc   • Hip pain   • Hypercholesterolemia   • Left nephrolithiasis   • Legg-Perthes disease   • Low back pain   • Neurogenic bladder   • Presence of stent in left circumflex coronary artery   • Recurrent urinary tract infection   • S/P CABG x 4   • Status post left hip replacement   • Umbilical hernia without obstruction and without gangrene   • Hyponatremia   • Right leg pain     Past Medical History:   Diagnosis Date   • CHF (congestive heart failure) ([facility])    • COPD (chronic obstructive pulmonary disease) ([facility])    • Coronary artery disease    • GERD (gastroesophageal reflux disease)    • Hypertension      Past Surgical History:   Procedure Laterality Date   • CARDIAC SURGERY        General Information     Row Name 21 1254          Physical Therapy Time and Intention    Document Type evaluation  -EL     Mode of Treatment physical therapy; co-treatment; occupational therapy  -EL     Row Name 21 1254          General Information    Patient Profile Reviewed yes  -EL     Prior Level of Function independent:; ADL's  recently been using w/c.  -EL     Existing Precautions/Restrictions fall  -EL      Barriers to Rehab medically complex; previous functional deficit  -EL     Row Name 12/08/21 1254          Living Environment    Lives With spouse  -EL     Row Name 12/08/21 1254          Home Main Entrance    Number of Stairs, Main Entrance three  -EL     Row Name 12/08/21 1254          Stairs Within Home, Primary    Number of Stairs, Within Home, Primary none  -EL     Row Name 12/08/21 1254          Cognition    Orientation Status (Cognition) oriented x 3  -EL     Row Name 12/08/21 1254          Safety Issues, Functional Mobility    Impairments Affecting Function (Mobility) balance; cognition; coordination; motor control; motor planning; muscle tone abnormal; pain; postural/trunk control; range of motion (ROM)  -EL     Cognitive Impairments, Mobility Safety/Performance attention; awareness, need for assistance; insight into deficits/self-awareness; judgment; problem-solving/reasoning  -EL           User Key  (r) = Recorded By, (t) = Taken By, (c) = Cosigned By    Initials Name Provider Type    Wayne Olivarez PT Physical Therapist               Mobility     Row Name 12/08/21 1304          Bed Mobility    Bed Mobility bed mobility (all) activities  -EL     All Activities, [place] (Bed Mobility) maximum assist (25% patient effort); 2 person assist  -EL     Comment (Bed Mobility) Seated balance requiring assistance due to posterior lean  -EL           User Key  (r) = Recorded By, (t) = Taken By, (c) = Cosigned By    Initials Name Provider Type    Wayne Olivarez PT Physical Therapist               Obj/Interventions     Row Name 12/08/21 1305          Range of Motion Comprehensive    General Range of Motion lower extremity range of motion deficits identified  -EL     Comment, General Range of Motion L knee limited, partially extended in sitting.  -     Row Name 12/08/21 1305          Strength Comprehensive (MMT)    General Manual Muscle Testing (MMT) Assessment lower extremity strength deficits identified  -EL      Comment, General Manual Muscle Testing (MMT) Assessment 2/5 gross BLE  -EL     Row Name 12/08/21 1305          Balance    Balance Assessment sitting static balance  -EL     Static Sitting Balance mild impairment  -EL           User Key  (r) = Recorded By, (t) = Taken By, (c) = Cosigned By    Initials Name Provider Type    Wayne Olivarez, PT Physical Therapist               Goals/Plan     Row Name 12/08/21 1312          Bed Mobility Goal 1 (PT)    Activity/Assistive Device (Bed Mobility Goal 1, PT) bed mobility activities, all  -EL     [place] Level/Cues Needed (Bed Mobility Goal 1, PT) minimum assist (75% or more patient effort)  -EL     Time Frame (Bed Mobility Goal 1, PT) long term goal (LTG); 2 weeks  -EL     Row Name 12/08/21 1312          Transfer Goal 1 (PT)    Activity/Assistive Device (Transfer Goal 1, PT) transfers, all; walker, rolling  -EL     [place] Level/Cues Needed (Transfer Goal 1, PT) moderate assist (50-74% patient effort)  -EL     Time Frame (Transfer Goal 1, PT) long term goal (LTG); 2 weeks  -EL     Row Name 12/08/21 1312          Gait Training Goal 1 (PT)    Activity/Assistive Device (Gait Training Goal 1, PT) gait (walking locomotion); walker, rolling  -EL     [place] Level (Gait Training Goal 1, PT) moderate assist (50-74% patient effort)  -EL     Distance (Gait Training Goal 1, PT) 15  -EL     Time Frame (Gait Training Goal 1, PT) long term goal (LTG); 2 weeks  -EL           User Key  (r) = Recorded By, (t) = Taken By, (c) = Cosigned By    Initials Name Provider Type    Wayne Olivarez PT Physical Therapist               Clinical Impression     Row Name 12/08/21 1307          Pain    Additional Documentation Pain Scale: FACES Pre/Post-Treatment (Group)  -     Row Name 12/08/21 1307          Pain Scale: FACES Pre/Post-Treatment    Pain: FACES Scale, Pretreatment 4-->hurts little more  -EL     Posttreatment Pain Rating 6-->hurts even more  -EL     Pain Location - Side Right  -EL      Pain Location - Orientation lower  -EL     Pain Location extremity; back  -EL     Row Name 12/08/21 1307          Plan of Care Review    Plan of Care Reviewed With patient  -EL     Outcome Summary Pt is a 77 YO M admitted with with BLE edema, and RLE pain. Pt states he lives at home with spouse, however she is currently in rehab. Pt typically independent with ADLs and ambulation, but has recently been transferring self to w/c. Pt states last few days he has been unable to transfer self. Pt this date requires MAX A x2 to come to sitting EOB and assistance to maintain seated balance. Pt with slight confusion, and attributes to lethargy. Pt with decreased sitting tolerance and requested to be returned to supine after approx 5 min. Pt is well below functional baseline and will require IP rehab following d/c.  -EL     Row Name 12/08/21 1307          Therapy Assessment/Plan (PT)    Rehab Potential (PT) good, to achieve stated therapy goals  -EL     Criteria for Skilled Interventions Met (PT) yes  -EL     Predicted Duration of Therapy Intervention (PT) Until d/c  -EL     Row Name 12/08/21 1307          Vital Signs    Pre Systolic BP Rehab 123  -EL     Pre Treatment Diastolic BP 42  -EL     Intra Systolic BP Rehab 120  -EL     Intra Treatment Diastolic BP 60  -EL     Post Systolic BP Rehab 106  -EL     Post Treatment Diastolic BP 60  -EL     Pretreatment Heart Rate (beats/min) 100  -EL     Intratreatment Heart Rate (beats/min) 107  -EL     Posttreatment Heart Rate (beats/min) 105  -EL     Pre SpO2 (%) 96  -EL     O2 Delivery Pre Treatment supplemental O2  -EL     Intra SpO2 (%) 88  -EL     O2 Delivery Intra Treatment supplemental O2  -EL     Post SpO2 (%) 95  -EL     O2 Delivery Post Treatment supplemental O2  -EL     Pre Patient Position Supine  -EL     Intra Patient Position Standing  -EL     Post Patient Position Supine  -EL     Row Name 12/08/21 1302          Positioning and Restraints    Pre-Treatment Position in  bed  -EL     Post Treatment Position bed  -EL     In Bed supine; notified nsg; call light within reach; encouraged to call for assist; exit alarm on  -EL           User Key  (r) = Recorded By, (t) = Taken By, (c) = Cosigned By    Initials Name Provider Type    Wayne Olivarez, PT Physical Therapist               Outcome Measures     Row Name 12/08/21 1313          How much help from another person do you currently need...    Turning from your back to your side while in flat bed without using bedrails? 2  -EL     Moving from lying on back to sitting on the side of a flat bed without bedrails? 2  -EL     Moving to and from a bed to a chair (including a wheelchair)? 1  -EL     Standing up from a chair using your arms (e.g., wheelchair, bedside chair)? 1  -EL     Climbing 3-5 steps with a railing? 1  -EL     To walk in hospital room? 1  -EL     AM-PAC 6 Clicks Score (PT) 8  -EL     Row Name 12/08/21 1313 12/08/21 1103       Functional Assessment    Outcome Measure Options AM-PAC 6 Clicks Basic Mobility (PT)  -EL AM-PAC 6 Clicks Daily Activity (OT)  -ES          User Key  (r) = Recorded By, (t) = Taken By, (c) = Cosigned By    Initials Name Provider Type    Susana Mclaughlin OT Occupational Therapist    Wayne Olivarez, PT Physical Therapist                             Physical Therapy Education                 Title: PT OT SLP Therapies (Done)     Topic: Physical Therapy (Done)     Point: Mobility training (Done)     Learning Progress Summary           Patient Acceptance, E,TB, VU by  at 12/8/2021 1314                   Point: Precautions (Done)     Learning Progress Summary           Patient Acceptance, E,TB, VU by  at 12/8/2021 1314                               User Key     Initials Effective Dates Name Provider Type Discipline     06/23/20 -  Wayne Shea, PT Physical Therapist PT              PT Recommendation and Plan  Planned Therapy Interventions (PT): balance training, neuromuscular re-education, bed  mobility training, transfer training, gait training, patient/family education, strengthening  Plan of Care Reviewed With: patient  Outcome Summary: Pt is a 77 YO M admitted with with BLE edema, and RLE pain. Pt states he lives at home with spouse, however she is currently in rehab. Pt typically independent with ADLs and ambulation, but has recently been transferring self to w/c. Pt states last few days he has been unable to transfer self. Pt this date requires MAX A x2 to come to sitting EOB and assistance to maintain seated balance. Pt with slight confusion, and attributes to lethargy. Pt with decreased sitting tolerance and requested to be returned to supine after approx 5 min. Pt is well below functional baseline and will require IP rehab following d/c.     Time Calculation:    PT Charges     Row Name 12/08/21 1315             Time Calculation    Start Time 0920  -EL      Stop Time 0934  -EL      Time Calculation (min) 14 min  -EL      PT Received On 12/08/21  -EL      PT - Next Appointment 12/10/21  -EL      PT Goal Re-Cert Due Date 12/22/21  -EL            User Key  (r) = Recorded By, (t) = Taken By, (c) = Cosigned By    Initials Name Provider Type    Wayne Olivarez PT Physical Therapist              Therapy Charges for Today     Code Description Service Date Service Provider Modifiers Qty    [id number] HC PT EVAL MOD COMPLEXITY 3 12/8/2021 Wayne Shea PT GP 1          PT G-Codes  Outcome Measure Options: AM-PAC 6 Clicks Basic Mobility (PT)  AM-PAC 6 Clicks Score (PT): 8  AM-PAC 6 Clicks Score (OT): 12    Wayne Shea PT  12/8/2021

## 2021-12-08 NOTE — CONSULTS
Attempted to see patient but he is confused will attempt to see tomorrow      SW received MDO for insurance/self pay status and assistance with in network oncologist.    Due to isolation precautions and to preserve PPE, SW contacted patient via room phone to complete initial assessment. Patient confirmed home address.  Patient repor

## 2021-12-08 NOTE — CONSULTS
Referring Provider: Hospitalist  Reason for Consultation: Shortness of breath and swelling of the feet    Patient Care Team:  Reuben Puga MD as PCP - General  Amairani Chopra DO (Family Medicine)    Chief complaint shortness of breath and swelling of the feet    Subjective .     History of present illness:  Jacky Judd is a 76 y.o. male with history of coronary disease respiratory bypass surgery history of congestive heart failure hypertension hyperlipidemia present to the hospital complains of increasing shortness of breath and swelling of the feet for the last 1 week.  Patient also has been having some leg pains.  No complains of any PND orthopnea.  No palpitation dizziness syncope or he says been taking his medicines regularly.  He does not smoke.  In the ER patient had an x-ray which showed mild atelectasis and vascular congestion cardiomegaly.  Patient had a lower extremity Doppler which showed superficial thrombophlebitis.  He is admitted and being ruled out for MI by EKG and enzymes.    Review of Systems   Constitutional: Negative for fever and malaise/fatigue.   HENT: Negative for ear pain and nosebleeds.    Eyes: Negative for blurred vision and double vision.   Cardiovascular: Positive for leg swelling. Negative for chest pain, dyspnea on exertion and palpitations.   Respiratory: Positive for shortness of breath. Negative for cough.    Skin: Negative for rash.   Musculoskeletal: Negative for joint pain.   Gastrointestinal: Negative for abdominal pain, nausea and vomiting.   Neurological: Negative for focal weakness and headaches.   Psychiatric/Behavioral: Negative for depression. The patient is not nervous/anxious.    All other systems reviewed and are negative.      History  Past Medical History:   Diagnosis Date   • CHF (congestive heart failure) ([facility])    • COPD (chronic obstructive pulmonary disease) (HCC)    • Coronary artery disease    • GERD (gastroesophageal reflux disease)    •  "Hypertension        Past Surgical History:   Procedure Laterality Date   • CARDIAC SURGERY         History reviewed. No pertinent family history.    Social History     Tobacco Use   • Smoking status: Never Smoker   • Smokeless tobacco: Not on file   Substance Use Topics   • Alcohol use: Never   • Drug use: Never        (Not in a hospital admission)        Sulfamethoxazole-trimethoprim and Morphine and related    Scheduled Meds:enoxaparin, 40 mg, Subcutaneous, Daily  sodium chloride, 10 mL, Intravenous, Q12H      Continuous Infusions:sodium chloride, 125 mL/hr, Last Rate: 125 mL/hr (12/08/21 1110)      PRN Meds:.•  acetaminophen **OR** acetaminophen **OR** acetaminophen  •  aluminum-magnesium hydroxide-simethicone  •  calcium carbonate  •  magnesium sulfate **OR** magnesium sulfate in D5W 1g/100mL (PREMIX)  •  melatonin  •  Morphine  •  nitroglycerin  •  ondansetron **OR** ondansetron  •  potassium chloride  •  potassium chloride  •  sodium chloride    Objective     VITAL SIGNS  Vitals:    12/08/21 0102 12/08/21 0202 12/08/21 0703 12/08/21 0753   BP: 99/53 103/57 103/57 115/53   BP Location:  Left arm  Left arm   Patient Position:  Lying  Lying   Pulse: 105 90  93   Resp:  16  16   Temp:  98 °F (36.7 °C)  97.7 °F (36.5 °C)   TempSrc:  Oral  Oral   SpO2: 97% 96%  94%   Weight:   81.6 kg (180 lb)    Height:   167.6 cm (66\")        Flowsheet Rows      First Filed Value   Admission Height 167.6 cm (66\") Documented at 12/07/2021 1741   Admission Weight 81.6 kg (180 lb) Documented at 12/07/2021 1741           TELEMETRY: Sinus rhythm with nonspecific ST segment abnormality    Physical Exam:  Constitutional:       Appearance: Well-developed.   Eyes:      General: No scleral icterus.     Conjunctiva/sclera: Conjunctivae normal.      Pupils: Pupils are equal, round, and reactive to light.   HENT:      Head: Normocephalic and atraumatic.   Neck:      Vascular: No carotid bruit or JVD.   Pulmonary:      Effort: Pulmonary effort " is normal.      Breath sounds: Normal breath sounds. No wheezing. No rales.   Cardiovascular:      Normal rate. Regular rhythm.   Pulses:     Intact distal pulses.   Abdominal:      General: Bowel sounds are normal.      Palpations: Abdomen is soft.   Musculoskeletal: Normal range of motion.      Cervical back: Normal range of motion and neck supple. Skin:     General: Skin is warm and dry.      Findings: No rash.   Neurological:      Mental Status: Alert.      Comments: No focal deficits          Results Review:   I reviewed the patient's new clinical results.  Lab Results (last 24 hours)     Procedure Component Value Units Date/Time    Hemoglobin A1c [[id number]]  (Abnormal) Collected: 12/08/21 0609    Specimen: Blood Updated: 12/08/21 1046     Hemoglobin A1C 5.8 %     Narrative:      Hemoglobin A1C Reference Range:    <5.7 %        Normal  5.7-6.4 %     Increased risk for diabetes  > 6.4 %        Diabetes       These guidelines have been recommended by the American Diabetic Association for Hgb A1c.      The following 2010 guidelines have been recommended by the American Diabetes Association for Hemoglobin A1c.    HBA1c 5.7-6.4% Increased risk for future diabetes (pre-diabetes)  HBA1c     >6.4% Diabetes      Troponin [[id number]]  (Normal) Collected: 12/08/21 0609    Specimen: Blood Updated: 12/08/21 0642     Troponin T 0.015 ng/mL     Narrative:      Troponin T Reference Range:  <= 0.03 ng/mL-   Negative for AMI  >0.03 ng/mL-     Abnormal for myocardial necrosis.  Clinicians would have to utilize clinical acumen, EKG, Troponin and serial changes to determine if it is an Acute Myocardial Infarction or myocardial injury due to an underlying chronic condition.       Results may be falsely decreased if patient taking Biotin.      Basic Metabolic Panel [[id number]]  (Abnormal) Collected: 12/08/21 0609    Specimen: Blood Updated: 12/08/21 0642     Glucose 99 mg/dL      BUN 15 mg/dL      Creatinine 0.74 mg/dL      Sodium  125 mmol/L      Potassium 4.7 mmol/L      Chloride 86 mmol/L      CO2 33.0 mmol/L      Calcium 8.1 mg/dL      eGFR   Amer 124 mL/min/1.73      eGFR Non African Amer 102 mL/min/1.73      BUN/Creatinine Ratio 20.3     Anion Gap 6.0 mmol/L     Narrative:      GFR Normal >60  Chronic Kidney Disease <60  Kidney Failure <15      Magnesium [[id number]]  (Normal) Collected: 12/08/21 0609    Specimen: Blood Updated: 12/08/21 0641     Magnesium 2.2 mg/dL     BNP [[id number]]  (Normal) Collected: 12/08/21 0609    Specimen: Blood Updated: 12/08/21 0638     proBNP 574.5 pg/mL     Narrative:      Among patients with dyspnea, NT-proBNP is highly sensitive for the detection of acute congestive heart failure. In addition NT-proBNP of <300 pg/ml effectively rules out acute congestive heart failure with 99% negative predictive value.    Results may be falsely decreased if patient taking Biotin.      CBC & Differential [[id number]]  (Abnormal) Collected: 12/08/21 0609    Specimen: Blood Updated: 12/08/21 0620    Narrative:      The following orders were created for panel order CBC & Differential.  Procedure                               Abnormality         Status                     ---------                               -----------         ------                     CBC Auto Differential[[id number]]        Abnormal            Final result                 Please view results for these tests on the individual orders.    CBC Auto Differential [[id number]]  (Abnormal) Collected: 12/08/21 0609    Specimen: Blood Updated: 12/08/21 0620     WBC 6.80 10*3/mm3      RBC 4.11 10*6/mm3      Hemoglobin 11.8 g/dL      Hematocrit 33.7 %      MCV 82.0 fL      MCH 28.7 pg      MCHC 35.0 g/dL      RDW 14.7 %      RDW-SD 42.4 fl      MPV 7.0 fL      Platelets 150 10*3/mm3      Neutrophil % 75.1 %      Lymphocyte % 13.8 %      Monocyte % 8.0 %      Eosinophil % 2.9 %      Basophil % 0.2 %      Neutrophils, Absolute 5.10 10*3/mm3      Lymphocytes,  Absolute 0.90 10*3/mm3      Monocytes, Absolute 0.50 10*3/mm3      Eosinophils, Absolute 0.20 10*3/mm3      Basophils, Absolute 0.00 10*3/mm3      nRBC 0.0 /100 WBC     ADH [[id number]] Collected: 12/08/21 0609    Specimen: Blood Updated: 12/08/21 0614    COVID PRE-OP / PRE-PROCEDURE SCREENING ORDER (NO ISOLATION) - Swab, Nasopharynx [[id number]]  (Normal) Collected: 12/08/21 0147    Specimen: Swab from Nasopharynx Updated: 12/08/21 0236    Narrative:      The following orders were created for panel order COVID PRE-OP / PRE-PROCEDURE SCREENING ORDER (NO ISOLATION) - Swab, Nasopharynx.  Procedure                               Abnormality         Status                     ---------                               -----------         ------                     COVID-19,CEPHEID/DANIEL/BD...[[id number]]  Normal              Final result                 Please view results for these tests on the individual orders.    COVID-19,CEPHEID/DANIEL/BDMAX,COR/[facility]/PAD/[facility] IN-HOUSE(OR EMERGENT/ADD-ON),NP SWAB IN TRANSPORT MEDIA 3-4 HR TAT, RT-PCR - Swab, Nasopharynx [[id number]]  (Normal) Collected: 12/08/21 0147    Specimen: Swab from Nasopharynx Updated: 12/08/21 0236     COVID19 Not Detected    Narrative:      Fact sheet for providers: https://www.fda.gov/media/856181/download     Fact sheet for patients: https://www.fda.gov/media/730289/download  Fact sheet for providers: https://www.fda.gov/media/364060/download     Fact sheet for patients: https://www.fda.gov/media/365802/download    Sodium, Urine, Random - Urine, Clean Catch [[id number]] Collected: 12/08/21 0107    Specimen: Urine, Clean Catch Updated: 12/08/21 0133     Sodium, Urine <20 mmol/L     Narrative:      Reference intervals for random urine have not been established.  Clinical usage is dependent upon physician's interpretation in combination with other laboratory tests.       Chloride, Urine, Random - Urine, Clean Catch [[id number]] Collected: 12/08/21 0107    Specimen: Urine,  Clean Catch Updated: 12/08/21 0133     Chloride, Urine <20 mmol/L     Narrative:      Reference intervals for random urine have not been established.  Clinical usage is dependent upon physician's interpretation in combination with other laboratory tests.       Osmolality, Urine - Urine, Clean Catch [[id number]]  (Abnormal) Collected: 12/08/21 0107    Specimen: Urine, Clean Catch Updated: 12/08/21 0131     Osmolality, Urine 137 mOsm/kg     Potassium, Urine, Random - Urine, Clean Catch [[id number]] Collected: 12/08/21 0107    Specimen: Urine, Clean Catch Updated: 12/08/21 0122     Potassium, Urine 14.4 mmol/L     Narrative:      Reference intervals for random urine have not been established.  Clinical usage is dependent upon physician's interpretation in combination with other laboratory tests.       Osmolality, Serum [[id number]]  (Abnormal) Collected: 12/07/21 1846    Specimen: Blood Updated: 12/07/21 2256     Osmolality 253 mOsm/kg     Lipid Panel [[id number]]  (Abnormal) Collected: 12/07/21 1846    Specimen: Blood Updated: 12/07/21 2252     Total Cholesterol 137 mg/dL      Triglycerides 167 mg/dL      HDL Cholesterol 44 mg/dL      LDL Cholesterol  65 mg/dL      VLDL Cholesterol 28 mg/dL      LDL/HDL Ratio 1.35    Narrative:      Cholesterol Reference Ranges  (U.S. Department of Health and Human Services ATP III Classifications)    Desirable          <200 mg/dL  Borderline High    200-239 mg/dL  High Risk          >240 mg/dL      Triglyceride Reference Ranges  (U.S. Department of Health and Human Services ATP III Classifications)    Normal           <150 mg/dL  Borderline High  150-199 mg/dL  High             200-499 mg/dL  Very High        >500 mg/dL    HDL Reference Ranges  (U.S. Department of Health and Human Services ATP III Classifcations)    Low     <40 mg/dl (major risk factor for CHD)  High    >60 mg/dl ('negative' risk factor for CHD)        LDL Reference Ranges  (U.S. Department of Health and Human Services  ATP III Classifcations)    Optimal          <100 mg/dL  Near Optimal     100-129 mg/dL  Borderline High  130-159 mg/dL  High             160-189 mg/dL  Very High        >189 mg/dL    Comprehensive Metabolic Panel [[id number]]  (Abnormal) Collected: 12/07/21 1846    Specimen: Blood Updated: 12/07/21 2121     Glucose 118 mg/dL      BUN 17 mg/dL      Creatinine 0.74 mg/dL      Sodium 120 mmol/L      Potassium 4.7 mmol/L      Chloride 79 mmol/L      CO2 32.0 mmol/L      Calcium 8.7 mg/dL      Total Protein 7.5 g/dL      Albumin 4.00 g/dL      ALT (SGPT) 20 U/L      AST (SGOT) 65 U/L      Alkaline Phosphatase 83 U/L      Total Bilirubin 0.4 mg/dL      eGFR Non African Amer 102 mL/min/1.73      eGFR  African Amer 124 mL/min/1.73      Globulin 3.5 gm/dL      A/G Ratio 1.1 g/dL      BUN/Creatinine Ratio 23.0     Anion Gap 9.0 mmol/L     Narrative:      GFR Normal >60  Chronic Kidney Disease <60  Kidney Failure <15      Troponin [[id number]]  (Normal) Collected: 12/07/21 1846    Specimen: Blood Updated: 12/07/21 2121     Troponin T 0.011 ng/mL     Narrative:      Troponin T Reference Range:  <= 0.03 ng/mL-   Negative for AMI  >0.03 ng/mL-     Abnormal for myocardial necrosis.  Clinicians would have to utilize clinical acumen, EKG, Troponin and serial changes to determine if it is an Acute Myocardial Infarction or myocardial injury due to an underlying chronic condition.       Results may be falsely decreased if patient taking Biotin.      Magnesium [[id number]]  (Normal) Collected: 12/07/21 1846    Specimen: Blood Updated: 12/07/21 2121     Magnesium 2.1 mg/dL     BNP [[id number]]  (Normal) Collected: 12/07/21 1846    Specimen: Blood Updated: 12/07/21 2119     proBNP 492.9 pg/mL     Narrative:      Among patients with dyspnea, NT-proBNP is highly sensitive for the detection of acute congestive heart failure. In addition NT-proBNP of <300 pg/ml effectively rules out acute congestive heart failure with 99% negative predictive  value.    Results may be falsely decreased if patient taking Biotin.      Extra Tubes [[id number]] Collected: 12/07/21 1846    Specimen: Blood Updated: 12/07/21 2000    Narrative:      The following orders were created for panel order Extra Tubes.  Procedure                               Abnormality         Status                     ---------                               -----------         ------                     Gold Top - SST[[id number]]                                   Final result               Green Top (Gel)[[id number]]                                  Final result               Light Blue Top[[id number]]                                   Final result                 Please view results for these tests on the individual orders.    Green Top (Gel) [[id number]] Collected: 12/07/21 1846    Specimen: Blood Updated: 12/07/21 2000     Extra Tube Hold for add-ons.     Comment: Auto resulted.       Gold Top - SST [[id number]] Collected: 12/07/21 1846    Specimen: Blood Updated: 12/07/21 2000     Extra Tube Hold for add-ons.     Comment: Auto resulted.       Light Blue Top [[id number]] Collected: 12/07/21 1846    Specimen: Blood Updated: 12/07/21 2000     Extra Tube hold for add-on     Comment: Auto resulted       CBC & Differential [[id number]]  (Abnormal) Collected: 12/07/21 1846    Specimen: Blood Updated: 12/07/21 1851    Narrative:      The following orders were created for panel order CBC & Differential.  Procedure                               Abnormality         Status                     ---------                               -----------         ------                     CBC Auto Differential[[id number]]        Abnormal            Final result                 Please view results for these tests on the individual orders.    CBC Auto Differential [[id number]]  (Abnormal) Collected: 12/07/21 1846    Specimen: Blood Updated: 12/07/21 1851     WBC 8.60 10*3/mm3      RBC 4.19 10*6/mm3      Hemoglobin 12.1 g/dL       Hematocrit 34.1 %      MCV 81.2 fL      MCH 28.9 pg      MCHC 35.6 g/dL      RDW 14.8 %      RDW-SD 42.9 fl      MPV 7.2 fL      Platelets 172 10*3/mm3      Neutrophil % 75.4 %      Lymphocyte % 13.7 %      Monocyte % 6.9 %      Eosinophil % 3.6 %      Basophil % 0.4 %      Neutrophils, Absolute 6.50 10*3/mm3      Lymphocytes, Absolute 1.20 10*3/mm3      Monocytes, Absolute 0.60 10*3/mm3      Eosinophils, Absolute 0.30 10*3/mm3      Basophils, Absolute 0.00 10*3/mm3      nRBC 0.0 /100 WBC           Imaging Results (Last 24 Hours)     Procedure Component Value Units Date/Time    XR Chest 1 View [[id number]] Collected: 12/07/21 1934     Updated: 12/07/21 1937    Narrative:      DATE OF EXAM:  12/7/2021 7:24 PM     PROCEDURE:  XR CHEST 1 VW-     INDICATIONS:  edema     COMPARISON:  No Comparisons Available     TECHNIQUE:   Single radiographic AP view of the chest was obtained.     FINDINGS:  Sternotomy wires are noted. The heart is enlarged. There is slight  prominence of central pulmonary vascular markings. Some vascular  congestion not excluded. It looks like there might be some atelectasis  at the left base. There are no large pleural effusions.        Impression:      1.Cardiomegaly.  2.Some vascular congestion not excluded.  3.Density left basilar area that could relate to some atelectasis.     Electronically Signed By-Scar Paul MD On:12/7/2021 7:35 PM  This report was finalized on [id number] by  Scar Paul MD.          EKG      I personally viewed and interpreted the patient's EKG/Telemetry data:    ECHOCARDIOGRAM:      STRESS MYOVIEW:    CARDIAC CATHETERIZATION:    OTHER:         Assessment/Plan     Principal Problem:    Hyponatremia  Active Problems:    Benign essential hypertension    Bladder outlet obstruction    CHF (congestive heart failure) (HCC)    Coronary arteriosclerosis in native artery    Gastroesophageal reflux disease    Hip pain    Hypercholesterolemia    Low back pain    S/P CABG x 4     Right leg pain      Patient presented with chest pain shortness of breath and is being ruled out for MI by get enzymes  Patient has history of congestive heart failure and has been on diuretics and he has hyponatremia and hence a nephrology consult is obtained  Patient has been having leg pains and has superficial thrombophlebitis  Patient will have an echocardiogram for LV function valvular abnormalities  Patient had history of coronary artery bypass surgery x4 vessels about 25 years ago and will most likely need a Lexiscan Myoview study to rule out any ischemia  Blood pressure and heart rate stable  Patient lipid levels are followed by the primary care doctor.    I discussed the patients findings and my recommendations with patient and nurse    Romulo Juares MD  12/08/21  11:27 EST

## 2021-12-08 NOTE — H&P
[facility] Medicine Services      Patient Name: Jacky Judd  : 1945  MRN: [id number]  Primary Care Physician:  Reuben Puga MD  Date of admission: 2021      Subjective      Chief Complaint: BLE leg swelling    History of Present Illness: Jacky Judd is a 76 y.o. male with past medical history of CABG, CHF, back surgery, hypertension who presented to [facility] on 2021 complaining of BLE leg swelling that started approximately 1 week ago and has gotten progressively worse.  Patient complained of intermittent right leg pain and muscle spasms and right leg.  Patient rates leg pain 6 on sliding scale 0-10.  Patient complains of intermittent chronic back pain and constant chronic hip pain, rating both areas of pain 7 on sliding scale 0-10.  Patient complains of weakness, left chest tenderness.  Patient has been having chronic shortness of air since 2021.  Patient complains of productive cough with yellowish-green sputum, nausea.  Patient stated he is unable to walk because of the pain and swelling.  Patient stated he was taking Lasix twice daily but his doctor recently decreased the frequency to once a day.  Patient denies vomiting, fever, chills.  Portillo catheter in place draining light yellow urine.    In the ED, chest x-ray showed cardiomegaly, some vascular congestion not excluded, density left basilar area that could relate to some atelectasis.  Bilateral venous lower extremity Doppler showed chronic RLE superficial thrombophlebitis noted in small saphenous, all other veins appeared normal bilaterally.  EKG showed sinus rhythm.  All labs unremarkable except sodium 120, glucose 118, AST 65, hemoglobin 12.1, hematocrit 34.1.  All vital signs unremarkable.  Patient received Bumex, morphine, nitroglycerin patch, Zofran in the ED.  Patient admitted to hospitalist service for further evaluation and treatment.    Review of Systems   Constitutional: Negative  for chills and fever.   HENT: Negative.    Eyes: Negative.    Cardiovascular: Positive for chest pain and leg swelling.   Respiratory: Positive for cough and shortness of breath.    Endocrine: Negative.    Skin: Negative.    Musculoskeletal: Positive for back pain and joint pain.   Gastrointestinal: Positive for nausea. Negative for vomiting.   Genitourinary: Negative.    Neurological: Positive for weakness.   Psychiatric/Behavioral: Negative.    Allergic/Immunologic: Negative.         Personal History     Past Medical History:   Diagnosis Date   • CHF (congestive heart failure) ([facility])    • COPD (chronic obstructive pulmonary disease) ([facility])    • Coronary artery disease    • GERD (gastroesophageal reflux disease)    • Hypertension        Past Surgical History:   Procedure Laterality Date   • CARDIAC SURGERY         Family History: family history is not on file. Otherwise pertinent FHx was reviewed and not pertinent to current issue.    Social History:  reports that he has never smoked. He does not have any smokeless tobacco history on file. He reports that he does not drink alcohol and does not use drugs.    Home Medications:  Prior to Admission Medications     None            Allergies:  Allergies   Allergen Reactions   • Sulfamethoxazole-Trimethoprim Rash   • Morphine And Related GI Intolerance     Nausea  Nausea         Objective      Vitals:   Temp:  [97.8 °F (36.6 °C)-98 °F (36.7 °C)] 98 °F (36.7 °C)  Heart Rate:  [] 90  Resp:  [16-19] 16  BP: ()/(53-71) 103/57  Flow (L/min):  [2] 2    Physical Exam  Vitals and nursing note reviewed.   Constitutional:       Appearance: Normal appearance.   HENT:      Head: Normocephalic.      Nose: Nose normal.      Mouth/Throat:      Pharynx: Oropharynx is clear.   Eyes:      Extraocular Movements: Extraocular movements intact.   Cardiovascular:      Rate and Rhythm: Normal rate and regular rhythm.      Pulses: Normal pulses.      Heart sounds: Normal heart sounds.    Pulmonary:      Effort: Pulmonary effort is normal.      Breath sounds: Normal breath sounds.   Abdominal:      General: Bowel sounds are normal.      Palpations: Abdomen is soft.      Hernia: A hernia is present.      Comments: Hiatal hernia noted   Genitourinary:     Comments: Portillo catheter in place, draining light yellow urine  Musculoskeletal:         General: Normal range of motion.      Cervical back: Normal range of motion.      Right lower leg: Edema present.      Left lower leg: Edema present.   Skin:     General: Skin is warm and dry.   Neurological:      Mental Status: He is alert and oriented to person, place, and time.      Motor: Weakness present.      Comments: BLE weak   Psychiatric:         Mood and Affect: Mood normal.         Behavior: Behavior normal.          Result Review    Result Review:  I have personally reviewed the results from the time of this admission to 12/8/2021 04:14 EST and agree with these findings:  [x]  Laboratory  []  Microbiology  [x]  Radiology  [x]  EKG/Telemetry   []  Cardiology/Vascular   []  Pathology  []  Old records  []  Other:  Most notable findings include: As above    Assessment/Plan        Active Hospital Problems:  Active Hospital Problems    Diagnosis    • **Hyponatremia    • CHF (congestive heart failure) (HCC)    • Right leg pain    • Bladder outlet obstruction      Formatting of this note might be different from the original.   bladder outlet obstruction 65/14 and put residual volume 400 mL     • Hip pain    • Benign essential hypertension    • Coronary arteriosclerosis in native artery      Formatting of this note might be different from the original.  4/12  cath occluded vein grafts R CA&debo 90obtuse mar LAD&1st diag grafts open     • Gastroesophageal reflux disease    • S/P CABG x 4    • Low back pain    • Hypercholesterolemia      Plan:     -----Home medication reconciliation not completed.  Will complete home med rec once nursing  reviews.-----    Hyponatremia  -Sodium 120  -Urine chloride, urine sodium, urine potassium ordered  -Serum osmolality, urine osmolality ordered  -Patient takes Lasix at home, recently decrease frequency from twice daily to once daily  -Consider nephrology consult  -Normal saline infusing  -BNP every 4 hours    Right leg pain  -Bilateral duplex venous lower extremity reviewed  -PT/OT consulted  -Monitor    CHF (congestive heart failure)   -Chest x-ray reviewed  -EKG reviewed  -proBNP WNL  -Magnesium WNL  -Initial troponin negative  -Serial troponins ordered  -Hemoglobin A1c ordered  -Echo ordered  -Cardiology consulted  -Daily weights  -Strict intake and output  -1500 mL p.o. fluid restriction    Benign essential hypertension  Hypercholesterolemia  -BP moderately controlled  -Lipid panel ordered    Bladder outlet obstruction    Coronary arteriosclerosis in native artery    Gastroesophageal reflux disease    Low back pain  Hip pain    S/P CABG x 4    DVT prophylaxis:  Medical DVT prophylaxis orders are present.    CODE STATUS:    Medical Intervention Limits: NO intubation (DNI)  Level Of Support Discussed With: Patient  Code Status (Patient has no pulse and is not breathing): No CPR (Do Not Attempt to Resuscitate)  Medical Interventions (Patient has pulse or is breathing): Limited Support    Admission Status:  I believe this patient meets observation status.    I discussed the patient's findings and my recommendations with patient.    This patient has been examined wearing appropriate Personal Protective Equipment. 12/08/21      Signature: Electronically signed by GISELE Gaytan, 12/08/21, 12:45 AM EST.

## 2021-12-08 NOTE — PLAN OF CARE
Problem: Pain Acute  Goal: Optimal Pain Control  Outcome: Ongoing, Progressing     Problem: Breathing Pattern Ineffective  Goal: Effective Breathing Pattern  Outcome: Ongoing, Progressing     Problem: Adult Inpatient Plan of Care  Goal: Plan of Care Review  Outcome: Ongoing, Progressing     Problem: Asthma Comorbidity  Goal: Maintenance of Asthma Control  Outcome: Ongoing, Progressing  Intervention: Maintain Asthma Symptom Control  Recent Flowsheet Documentation  Taken 12/8/2021 1500 by Jyothi Rdz RN  Medication Review/Management: medications reviewed  Taken 12/8/2021 1350 by Jyothi Rdz RN  Medication Review/Management: medications reviewed     Problem: COPD Comorbidity  Goal: Maintenance of COPD Symptom Control  Outcome: Ongoing, Progressing  Intervention: Maintain COPD-Symptom Control  Recent Flowsheet Documentation  Taken 12/8/2021 1500 by Jyothi Rdz RN  Medication Review/Management: medications reviewed  Taken 12/8/2021 1350 by Jyothi Rdz RN  Medication Review/Management: medications reviewed     Problem: Diabetes Comorbidity  Goal: Blood Glucose Level Within Desired Range  Outcome: Ongoing, Progressing     Problem: Heart Failure Comorbidity  Goal: Maintenance of Heart Failure Symptom Control  Outcome: Ongoing, Progressing  Intervention: Maintain Heart Failure-Management Strategies  Recent Flowsheet Documentation  Taken 12/8/2021 1500 by Jyothi Rdz RN  Medication Review/Management: medications reviewed  Taken 12/8/2021 1350 by Jyothi Rdz RN  Medication Review/Management: medications reviewed     Problem: Hypertension Comorbidity  Goal: Blood Pressure in Desired Range  Outcome: Ongoing, Progressing  Intervention: Maintain Hypertension-Management Strategies  Recent Flowsheet Documentation  Taken 12/8/2021 1500 by Jyothi Rdz RN  Medication Review/Management: medications reviewed  Taken 12/8/2021 1350 by Jyothi Rdz RN  Medication Review/Management:  medications reviewed     Problem: Obstructive Sleep Apnea Risk or Actual (Comorbidity Management)  Goal: Unobstructed Breathing During Sleep  Outcome: Ongoing, Progressing     Problem: Pain Chronic (Persistent) (Comorbidity Management)  Goal: Acceptable Pain Control and Functional Ability  Outcome: Ongoing, Progressing  Intervention: Manage Persistent Pain  Recent Flowsheet Documentation  Taken 12/8/2021 1500 by Jyothi Rdz, RN  Medication Review/Management: medications reviewed  Taken 12/8/2021 1350 by Jyothi Rdz, RN  Medication Review/Management: medications reviewed     Problem: Seizure Disorder Comorbidity  Goal: Maintenance of Seizure Control  Outcome: Ongoing, Progressing     Problem: Skin Injury Risk Increased  Goal: Skin Health and Integrity  Outcome: Ongoing, Progressing   Goal Outcome Evaluation:

## 2021-12-09 LAB
ANION GAP SERPL CALCULATED.3IONS-SCNC: 4 MMOL/L (ref 5–15)
ANION GAP SERPL CALCULATED.3IONS-SCNC: 4 MMOL/L (ref 5–15)
ANION GAP SERPL CALCULATED.3IONS-SCNC: 5 MMOL/L (ref 5–15)
ANION GAP SERPL CALCULATED.3IONS-SCNC: 5 MMOL/L (ref 5–15)
ANION GAP SERPL CALCULATED.3IONS-SCNC: 7 MMOL/L (ref 5–15)
BACTERIA UR QL AUTO: ABNORMAL /HPF
BASOPHILS # BLD AUTO: 0 10*3/MM3 (ref 0–0.2)
BASOPHILS NFR BLD AUTO: 0.3 % (ref 0–1.5)
BH CV ECHO MEAS - ACS: 2.1 CM
BH CV ECHO MEAS - AO MAX PG (FULL): 0.64 MMHG
BH CV ECHO MEAS - AO MAX PG: 9.6 MMHG
BH CV ECHO MEAS - AO MEAN PG (FULL): -0.13 MMHG
BH CV ECHO MEAS - AO MEAN PG: 5.5 MMHG
BH CV ECHO MEAS - AO ROOT AREA (BSA CORRECTED): 1.6
BH CV ECHO MEAS - AO ROOT AREA: 7.5 CM^2
BH CV ECHO MEAS - AO ROOT DIAM: 3.1 CM
BH CV ECHO MEAS - AO V2 MAX: 154.6 CM/SEC
BH CV ECHO MEAS - AO V2 MEAN: 113.4 CM/SEC
BH CV ECHO MEAS - AO V2 VTI: 33.7 CM
BH CV ECHO MEAS - AORTIC HR: 92.2 BPM
BH CV ECHO MEAS - AORTIC R-R: 0.65 SEC
BH CV ECHO MEAS - AVA(I,A): 3.7 CM^2
BH CV ECHO MEAS - AVA(I,D): 3.7 CM^2
BH CV ECHO MEAS - AVA(V,A): 3.7 CM^2
BH CV ECHO MEAS - AVA(V,D): 3.7 CM^2
BH CV ECHO MEAS - BSA(HAYCOCK): 2 M^2
BH CV ECHO MEAS - BSA: 1.9 M^2
BH CV ECHO MEAS - BZI_BMI: 29.1 KILOGRAMS/M^2
BH CV ECHO MEAS - BZI_METRIC_HEIGHT: 167.6 CM
BH CV ECHO MEAS - BZI_METRIC_WEIGHT: 81.6 KG
BH CV ECHO MEAS - CI(AO): 12.2 L/MIN/M^2
BH CV ECHO MEAS - CI(LVOT): 6.1 L/MIN/M^2
BH CV ECHO MEAS - CO(AO): 23.3 L/MIN
BH CV ECHO MEAS - CO(LVOT): 11.6 L/MIN
BH CV ECHO MEAS - EDV(CUBED): 100.9 ML
BH CV ECHO MEAS - EDV(MOD-SP4): 76.9 ML
BH CV ECHO MEAS - EDV(TEICH): 100.1 ML
BH CV ECHO MEAS - EF(CUBED): 55.7 %
BH CV ECHO MEAS - EF(MOD-BP): 70 %
BH CV ECHO MEAS - EF(MOD-SP4): 69.5 %
BH CV ECHO MEAS - EF(TEICH): 47.4 %
BH CV ECHO MEAS - ESV(CUBED): 44.8 ML
BH CV ECHO MEAS - ESV(MOD-SP4): 23.5 ML
BH CV ECHO MEAS - ESV(TEICH): 52.7 ML
BH CV ECHO MEAS - FS: 23.7 %
BH CV ECHO MEAS - IVS/LVPW: 1
BH CV ECHO MEAS - IVSD: 1.1 CM
BH CV ECHO MEAS - LA DIMENSION(2D): 3.8 CM
BH CV ECHO MEAS - LV DIASTOLIC VOL/BSA (35-75): 40.2 ML/M^2
BH CV ECHO MEAS - LV MASS(C)D: 192.3 GRAMS
BH CV ECHO MEAS - LV MASS(C)DI: 100.5 GRAMS/M^2
BH CV ECHO MEAS - LV MAX PG: 8.9 MMHG
BH CV ECHO MEAS - LV MEAN PG: 5.7 MMHG
BH CV ECHO MEAS - LV SYSTOLIC VOL/BSA (12-30): 12.3 ML/M^2
BH CV ECHO MEAS - LV V1 MAX: 149.3 CM/SEC
BH CV ECHO MEAS - LV V1 MEAN: 113.8 CM/SEC
BH CV ECHO MEAS - LV V1 VTI: 32.5 CM
BH CV ECHO MEAS - LVIDD: 4.7 CM
BH CV ECHO MEAS - LVIDS: 3.6 CM
BH CV ECHO MEAS - LVOT AREA: 3.9 CM^2
BH CV ECHO MEAS - LVOT DIAM: 2.2 CM
BH CV ECHO MEAS - LVPWD: 1.1 CM
BH CV ECHO MEAS - MV A MAX VEL: 108.4 CM/SEC
BH CV ECHO MEAS - MV DEC SLOPE: 496.9 CM/SEC^2
BH CV ECHO MEAS - MV DEC TIME: 0.23 SEC
BH CV ECHO MEAS - MV E MAX VEL: 116.5 CM/SEC
BH CV ECHO MEAS - MV E/A: 1.1
BH CV ECHO MEAS - MV MAX PG: 6.7 MMHG
BH CV ECHO MEAS - MV MEAN PG: 3.4 MMHG
BH CV ECHO MEAS - MV V2 MAX: 129.7 CM/SEC
BH CV ECHO MEAS - MV V2 MEAN: 87.5 CM/SEC
BH CV ECHO MEAS - MV V2 VTI: 25.6 CM
BH CV ECHO MEAS - MVA(VTI): 4.9 CM^2
BH CV ECHO MEAS - PA MAX PG (FULL): 5 MMHG
BH CV ECHO MEAS - PA MAX PG: 9.4 MMHG
BH CV ECHO MEAS - PA V2 MAX: 152.9 CM/SEC
BH CV ECHO MEAS - RV MAX PG: 4.4 MMHG
BH CV ECHO MEAS - RV MEAN PG: 2.4 MMHG
BH CV ECHO MEAS - RV V1 MAX: 105.2 CM/SEC
BH CV ECHO MEAS - RV V1 MEAN: 71.9 CM/SEC
BH CV ECHO MEAS - RV V1 VTI: 19.2 CM
BH CV ECHO MEAS - RVDD: 2.6 CM
BH CV ECHO MEAS - SI(AO): 132 ML/M^2
BH CV ECHO MEAS - SI(CUBED): 29.4 ML/M^2
BH CV ECHO MEAS - SI(LVOT): 65.7 ML/M^2
BH CV ECHO MEAS - SI(MOD-SP4): 27.9 ML/M^2
BH CV ECHO MEAS - SI(TEICH): 24.8 ML/M^2
BH CV ECHO MEAS - SV(AO): 252.4 ML
BH CV ECHO MEAS - SV(CUBED): 56.2 ML
BH CV ECHO MEAS - SV(LVOT): 125.7 ML
BH CV ECHO MEAS - SV(MOD-SP4): 53.4 ML
BH CV ECHO MEAS - SV(TEICH): 47.5 ML
BILIRUB UR QL STRIP: NEGATIVE
BUN SERPL-MCNC: 11 MG/DL (ref 8–23)
BUN SERPL-MCNC: 12 MG/DL (ref 8–23)
BUN SERPL-MCNC: 13 MG/DL (ref 8–23)
BUN SERPL-MCNC: 14 MG/DL (ref 8–23)
BUN SERPL-MCNC: 14 MG/DL (ref 8–23)
BUN/CREAT SERPL: 15.5 (ref 7–25)
BUN/CREAT SERPL: 16 (ref 7–25)
BUN/CREAT SERPL: 17.5 (ref 7–25)
BUN/CREAT SERPL: 17.6 (ref 7–25)
BUN/CREAT SERPL: 17.9 (ref 7–25)
CALCIUM SPEC-SCNC: 8.3 MG/DL (ref 8.6–10.5)
CALCIUM SPEC-SCNC: 8.4 MG/DL (ref 8.6–10.5)
CALCIUM SPEC-SCNC: 8.6 MG/DL (ref 8.6–10.5)
CHLORIDE SERPL-SCNC: 88 MMOL/L (ref 98–107)
CHLORIDE SERPL-SCNC: 89 MMOL/L (ref 98–107)
CHLORIDE SERPL-SCNC: 89 MMOL/L (ref 98–107)
CHLORIDE SERPL-SCNC: 90 MMOL/L (ref 98–107)
CHLORIDE SERPL-SCNC: 90 MMOL/L (ref 98–107)
CLARITY UR: ABNORMAL
CO2 SERPL-SCNC: 31 MMOL/L (ref 22–29)
CO2 SERPL-SCNC: 32 MMOL/L (ref 22–29)
CO2 SERPL-SCNC: 33 MMOL/L (ref 22–29)
CO2 SERPL-SCNC: 34 MMOL/L (ref 22–29)
CO2 SERPL-SCNC: 34 MMOL/L (ref 22–29)
COLOR UR: YELLOW
CREAT SERPL-MCNC: 0.71 MG/DL (ref 0.76–1.27)
CREAT SERPL-MCNC: 0.74 MG/DL (ref 0.76–1.27)
CREAT SERPL-MCNC: 0.75 MG/DL (ref 0.76–1.27)
CREAT SERPL-MCNC: 0.78 MG/DL (ref 0.76–1.27)
CREAT SERPL-MCNC: 0.8 MG/DL (ref 0.76–1.27)
CREAT UR-MCNC: 79.4 MG/DL
CREAT UR-MCNC: 79.4 MG/DL
DEPRECATED RDW RBC AUTO: 44.2 FL (ref 37–54)
EOSINOPHIL # BLD AUTO: 0.2 10*3/MM3 (ref 0–0.4)
EOSINOPHIL NFR BLD AUTO: 3.2 % (ref 0.3–6.2)
ERYTHROCYTE [DISTWIDTH] IN BLOOD BY AUTOMATED COUNT: 15 % (ref 12.3–15.4)
GFR SERPL CREATININE-BSD FRML MDRD: 101 ML/MIN/1.73
GFR SERPL CREATININE-BSD FRML MDRD: 102 ML/MIN/1.73
GFR SERPL CREATININE-BSD FRML MDRD: 107 ML/MIN/1.73
GFR SERPL CREATININE-BSD FRML MDRD: 113 ML/MIN/1.73
GFR SERPL CREATININE-BSD FRML MDRD: 117 ML/MIN/1.73
GFR SERPL CREATININE-BSD FRML MDRD: 122 ML/MIN/1.73
GFR SERPL CREATININE-BSD FRML MDRD: 124 ML/MIN/1.73
GFR SERPL CREATININE-BSD FRML MDRD: 130 ML/MIN/1.73
GFR SERPL CREATININE-BSD FRML MDRD: 93 ML/MIN/1.73
GFR SERPL CREATININE-BSD FRML MDRD: 96 ML/MIN/1.73
GLUCOSE SERPL-MCNC: 104 MG/DL (ref 65–99)
GLUCOSE SERPL-MCNC: 116 MG/DL (ref 65–99)
GLUCOSE SERPL-MCNC: 123 MG/DL (ref 65–99)
GLUCOSE SERPL-MCNC: 124 MG/DL (ref 65–99)
GLUCOSE SERPL-MCNC: 98 MG/DL (ref 65–99)
GLUCOSE UR STRIP-MCNC: NEGATIVE MG/DL
HCT VFR BLD AUTO: 32.3 % (ref 37.5–51)
HGB BLD-MCNC: 10.9 G/DL (ref 13–17.7)
HGB UR QL STRIP.AUTO: ABNORMAL
HYALINE CASTS UR QL AUTO: ABNORMAL /LPF
KETONES UR QL STRIP: NEGATIVE
LEUKOCYTE ESTERASE UR QL STRIP.AUTO: ABNORMAL
LYMPHOCYTES # BLD AUTO: 1.2 10*3/MM3 (ref 0.7–3.1)
LYMPHOCYTES NFR BLD AUTO: 15.6 % (ref 19.6–45.3)
MAGNESIUM SERPL-MCNC: 2.2 MG/DL (ref 1.6–2.4)
MCH RBC QN AUTO: 28.3 PG (ref 26.6–33)
MCHC RBC AUTO-ENTMCNC: 33.9 G/DL (ref 31.5–35.7)
MCV RBC AUTO: 83.7 FL (ref 79–97)
MONOCYTES # BLD AUTO: 0.9 10*3/MM3 (ref 0.1–0.9)
MONOCYTES NFR BLD AUTO: 11.6 % (ref 5–12)
NEUTROPHILS NFR BLD AUTO: 5.2 10*3/MM3 (ref 1.7–7)
NEUTROPHILS NFR BLD AUTO: 69.3 % (ref 42.7–76)
NITRITE UR QL STRIP: NEGATIVE
NRBC BLD AUTO-RTO: 0.1 /100 WBC (ref 0–0.2)
NT-PROBNP SERPL-MCNC: 1439 PG/ML (ref 0–1800)
NT-PROBNP SERPL-MCNC: 1790 PG/ML (ref 0–1800)
NT-PROBNP SERPL-MCNC: 1972 PG/ML (ref 0–1800)
NT-PROBNP SERPL-MCNC: 2017 PG/ML (ref 0–1800)
NT-PROBNP SERPL-MCNC: 2242 PG/ML (ref 0–1800)
PH UR STRIP.AUTO: 6.5 [PH] (ref 5–8)
PLATELET # BLD AUTO: 146 10*3/MM3 (ref 140–450)
PMV BLD AUTO: 7.6 FL (ref 6–12)
POTASSIUM SERPL-SCNC: 4.7 MMOL/L (ref 3.5–5.2)
POTASSIUM SERPL-SCNC: 4.7 MMOL/L (ref 3.5–5.2)
POTASSIUM SERPL-SCNC: 4.8 MMOL/L (ref 3.5–5.2)
POTASSIUM SERPL-SCNC: 4.9 MMOL/L (ref 3.5–5.2)
POTASSIUM SERPL-SCNC: 4.9 MMOL/L (ref 3.5–5.2)
PROT ?TM UR-MCNC: 53 MG/DL
PROT UR QL STRIP: ABNORMAL
PROT/CREAT UR: 667.5 MG/G CREA (ref 0–200)
QT INTERVAL: 408 MS
RBC # BLD AUTO: 3.86 10*6/MM3 (ref 4.14–5.8)
RBC # UR STRIP: ABNORMAL /HPF
REF LAB TEST METHOD: ABNORMAL
SODIUM SERPL-SCNC: 125 MMOL/L (ref 136–145)
SODIUM SERPL-SCNC: 127 MMOL/L (ref 136–145)
SODIUM SERPL-SCNC: 127 MMOL/L (ref 136–145)
SODIUM SERPL-SCNC: 128 MMOL/L (ref 136–145)
SODIUM SERPL-SCNC: 128 MMOL/L (ref 136–145)
SP GR UR STRIP: 1.01 (ref 1–1.03)
SQUAMOUS #/AREA URNS HPF: ABNORMAL /HPF
UROBILINOGEN UR QL STRIP: ABNORMAL
WBC # UR STRIP: ABNORMAL /HPF
WBC NRBC COR # BLD: 7.5 10*3/MM3 (ref 3.4–10.8)

## 2021-12-09 PROCEDURE — 83880 ASSAY OF NATRIURETIC PEPTIDE: CPT | Performed by: NURSE PRACTITIONER

## 2021-12-09 PROCEDURE — 94640 AIRWAY INHALATION TREATMENT: CPT

## 2021-12-09 PROCEDURE — 80048 BASIC METABOLIC PNL TOTAL CA: CPT | Performed by: NURSE PRACTITIONER

## 2021-12-09 PROCEDURE — 99232 SBSQ HOSP IP/OBS MODERATE 35: CPT | Performed by: INTERNAL MEDICINE

## 2021-12-09 PROCEDURE — 94799 UNLISTED PULMONARY SVC/PX: CPT

## 2021-12-09 PROCEDURE — 81001 URINALYSIS AUTO W/SCOPE: CPT | Performed by: INTERNAL MEDICINE

## 2021-12-09 PROCEDURE — 99232 SBSQ HOSP IP/OBS MODERATE 35: CPT | Performed by: HOSPITALIST

## 2021-12-09 PROCEDURE — 85025 COMPLETE CBC W/AUTO DIFF WBC: CPT | Performed by: NURSE PRACTITIONER

## 2021-12-09 PROCEDURE — 82570 ASSAY OF URINE CREATININE: CPT | Performed by: HOSPITALIST

## 2021-12-09 PROCEDURE — 0 MORPHINE SULFATE 4 MG/ML SOLUTION: Performed by: NURSE PRACTITIONER

## 2021-12-09 PROCEDURE — 84156 ASSAY OF PROTEIN URINE: CPT | Performed by: HOSPITALIST

## 2021-12-09 PROCEDURE — 25010000002 ENOXAPARIN PER 10 MG: Performed by: NURSE PRACTITIONER

## 2021-12-09 RX ORDER — SENNA PLUS 8.6 MG/1
2 TABLET ORAL 2 TIMES DAILY
Status: DISCONTINUED | OUTPATIENT
Start: 2021-12-09 | End: 2021-12-10 | Stop reason: HOSPADM

## 2021-12-09 RX ORDER — OXYCODONE HYDROCHLORIDE 5 MG/1
10 TABLET ORAL EVERY 4 HOURS PRN
Status: DISCONTINUED | OUTPATIENT
Start: 2021-12-08 | End: 2021-12-10 | Stop reason: HOSPADM

## 2021-12-09 RX ORDER — MULTIPLE VITAMINS W/ MINERALS TAB 9MG-400MCG
1 TAB ORAL DAILY
Status: DISCONTINUED | OUTPATIENT
Start: 2021-12-09 | End: 2021-12-10 | Stop reason: HOSPADM

## 2021-12-09 RX ORDER — BUMETANIDE 0.25 MG/ML
1 INJECTION INTRAMUSCULAR; INTRAVENOUS DAILY
Status: DISCONTINUED | OUTPATIENT
Start: 2021-12-10 | End: 2021-12-10 | Stop reason: HOSPADM

## 2021-12-09 RX ORDER — ALBUTEROL SULFATE 2.5 MG/3ML
2.5 SOLUTION RESPIRATORY (INHALATION) EVERY 8 HOURS PRN
Status: DISCONTINUED | OUTPATIENT
Start: 2021-12-08 | End: 2021-12-10 | Stop reason: HOSPADM

## 2021-12-09 RX ORDER — ATENOLOL 25 MG/1
12.5 TABLET ORAL DAILY
Status: DISCONTINUED | OUTPATIENT
Start: 2021-12-09 | End: 2021-12-10 | Stop reason: HOSPADM

## 2021-12-09 RX ORDER — BUDESONIDE AND FORMOTEROL FUMARATE DIHYDRATE 160; 4.5 UG/1; UG/1
2 AEROSOL RESPIRATORY (INHALATION) NIGHTLY
Status: DISCONTINUED | OUTPATIENT
Start: 2021-12-09 | End: 2021-12-10 | Stop reason: HOSPADM

## 2021-12-09 RX ORDER — CYCLOBENZAPRINE HCL 10 MG
5 TABLET ORAL 3 TIMES DAILY PRN
Status: DISCONTINUED | OUTPATIENT
Start: 2021-12-08 | End: 2021-12-10 | Stop reason: HOSPADM

## 2021-12-09 RX ORDER — PANTOPRAZOLE SODIUM 40 MG/1
40 TABLET, DELAYED RELEASE ORAL DAILY
Status: DISCONTINUED | OUTPATIENT
Start: 2021-12-09 | End: 2021-12-10 | Stop reason: HOSPADM

## 2021-12-09 RX ORDER — ISOSORBIDE MONONITRATE 60 MG/1
60 TABLET, EXTENDED RELEASE ORAL DAILY
Status: DISCONTINUED | OUTPATIENT
Start: 2021-12-09 | End: 2021-12-10 | Stop reason: HOSPADM

## 2021-12-09 RX ADMIN — CYCLOBENZAPRINE 5 MG: 10 TABLET, FILM COATED ORAL at 00:58

## 2021-12-09 RX ADMIN — PANTOPRAZOLE SODIUM 40 MG: 40 TABLET, DELAYED RELEASE ORAL at 08:39

## 2021-12-09 RX ADMIN — BUDESONIDE AND FORMOTEROL FUMARATE DIHYDRATE 2 PUFF: 160; 4.5 AEROSOL RESPIRATORY (INHALATION) at 19:40

## 2021-12-09 RX ADMIN — SODIUM CHLORIDE, PRESERVATIVE FREE 10 ML: 5 INJECTION INTRAVENOUS at 08:39

## 2021-12-09 RX ADMIN — SODIUM CHLORIDE, PRESERVATIVE FREE 10 ML: 5 INJECTION INTRAVENOUS at 19:09

## 2021-12-09 RX ADMIN — ISOSORBIDE MONONITRATE 60 MG: 60 TABLET, EXTENDED RELEASE ORAL at 08:39

## 2021-12-09 RX ADMIN — MORPHINE SULFATE 2 MG: 4 INJECTION INTRAVENOUS at 13:38

## 2021-12-09 RX ADMIN — OXYCODONE 10 MG: 5 TABLET ORAL at 03:17

## 2021-12-09 RX ADMIN — CYCLOBENZAPRINE 5 MG: 10 TABLET, FILM COATED ORAL at 08:44

## 2021-12-09 RX ADMIN — OXYCODONE 10 MG: 5 TABLET ORAL at 19:08

## 2021-12-09 RX ADMIN — STANDARDIZED SENNA CONCENTRATE 2 TABLET: 8.6 TABLET ORAL at 19:09

## 2021-12-09 RX ADMIN — OXYCODONE 10 MG: 5 TABLET ORAL at 13:38

## 2021-12-09 RX ADMIN — OXYCODONE 10 MG: 5 TABLET ORAL at 08:44

## 2021-12-09 RX ADMIN — MULTIPLE VITAMINS W/ MINERALS TAB 1 TABLET: TAB at 08:39

## 2021-12-09 RX ADMIN — Medication 5 MG: at 00:58

## 2021-12-09 RX ADMIN — BUDESONIDE AND FORMOTEROL FUMARATE DIHYDRATE 2 PUFF: 160; 4.5 AEROSOL RESPIRATORY (INHALATION) at 00:50

## 2021-12-09 RX ADMIN — STANDARDIZED SENNA CONCENTRATE 2 TABLET: 8.6 TABLET ORAL at 03:17

## 2021-12-09 RX ADMIN — ATENOLOL 12.5 MG: 25 TABLET ORAL at 08:39

## 2021-12-09 RX ADMIN — ROSUVASTATIN CALCIUM 10 MG: 10 TABLET, FILM COATED ORAL at 19:09

## 2021-12-09 RX ADMIN — MORPHINE SULFATE 2 MG: 4 INJECTION INTRAVENOUS at 08:52

## 2021-12-09 RX ADMIN — STANDARDIZED SENNA CONCENTRATE 2 TABLET: 8.6 TABLET ORAL at 08:52

## 2021-12-09 RX ADMIN — MORPHINE SULFATE 2 MG: 4 INJECTION INTRAVENOUS at 20:49

## 2021-12-09 RX ADMIN — ENOXAPARIN SODIUM 40 MG: 40 INJECTION SUBCUTANEOUS at 15:04

## 2021-12-09 NOTE — PLAN OF CARE
Goal Outcome Evaluation:              Outcome Summary: Pt continues with edema and multiple complaints of pain. Prn pain medicine given with moderate success.  Portillo cath continues to drain yellow urine with some sediment to gravity drainage bag.  Will continue to monitor.

## 2021-12-09 NOTE — CASE MANAGEMENT/SOCIAL WORK
Discharge Planning Assessment   [facility]     Patient Name: Jacky Judd  MRN: [id number]  Today's Date: 12/9/2021    Admit Date: 12/7/2021     Discharge Needs Assessment     Row Name 12/09/21 1405       Living Environment    Lives With alone; spouse    Name(s) of Who Lives With Patient Spouse currently at [place]    Current Living Arrangements home/apartment/condo    Primary Care Provided by self    Provides Primary Care For no one, unable/limited ability to care for self    Family Caregiver if Needed child(cadence), adult; spouse    Family Caregiver Names Daughter-Natalie, wife    Quality of Family Relationships helpful; involved; supportive    Able to Return to Prior Arrangements yes       Resource/Environmental Concerns    Resource/Environmental Concerns none    Transportation Concerns car, none       Transition Planning    Patient/Family Anticipates Transition to home; home with help/services; inpatient rehabilitation facility    Patient/Family Anticipated Services at Transition rehabilitation services; skilled nursing; home health care    Transportation Anticipated family or friend will provide       Discharge Needs Assessment    Readmission Within the Last 30 Days no previous admission in last 30 days    Equipment Currently Used at Home wheelchair; power chair,(recliner lift); oxygen; other (see comments)  catheter supplies    Concerns to be Addressed discharge planning; care coordination/care conferences    Anticipated Changes Related to Illness none    Equipment Needed After Discharge none    Outpatient/Agency/Support Group Needs skilled nursing facility    Discharge Facility/Level of Care Needs rehabilitation facility; nursing facility, skilled    Provided Post Acute Provider List? Yes    Post Acute Provider List Inpatient Rehab; Nursing Home    Provided Post Acute Provider Quality & Resource List? N/A    Delivered To Patient; Support Person    Support Person daughter-email    Method of Delivery In person     Patient's Choice of Community Agency(s) [place]               Discharge Plan     Row Name 12/09/21 3303       Plan    Plan DC plan: from home; referral to [place] pending. No precert/PASRR needed.    Patient/Family in Agreement with Plan yes    Plan Comments Met with patient at bedside. States he lives at home, normally with wife but she is currently at [facility]. Has a daughter who lives in [place]. Says he is normally in his recliner chair or wheelchair. Not able to cook or clean. Takes basin baths. Wears O2 (unable to verify supplier) and has ayala catheter supplies. Discussed rehab per PT/OT's recommendations. Pt is not very interested but would go to [place] if he could. Referral made in Epic basket and liaison notified. CM also called and spoke to daughter regarding patient's discharge plan. She states she had been taking care of her mother the last month, is unhappy with previous stay at [facility], doesn't feel she could provide the care for her dad. Rehab list emailed to her to review with patient's wife.              Continued Care and Services - Admitted Since 12/7/2021     Destination     Service Provider Request Status Selected Services Address Phone Fax Patient Preferred    [facility]  Pending - Request Sent N/A [id number] [place] [id number] [phone] --              Expected Discharge Date and Time     Expected Discharge Date Expected Discharge Time    Dec 9, 2021          Demographic Summary     Row Name 12/09/21 1405       General Information    Admission Type inpatient    Arrived From emergency department    Referral Source admission list    Reason for Consult discharge planning; care coordination/care conference    Preferred Language English     Used During This Interaction no       Contact Information    Permission Granted to Share Info With                Functional Status     Row Name 12/09/21 7280        Functional Status    Usual Activity Tolerance fair    Current Activity Tolerance fair       Functional Status, IADL    Medications assistive person    Meal Preparation assistive person    Housekeeping completely dependent    Laundry completely dependent    Shopping completely dependent              Met with patient in room wearing PPE: mask, face shield/goggles.      Maintained distance greater than six feet and spent less than 15 minutes in the room.      Megan Naegele, RN      Office Phone: [phone]  Office Cell: [phone]

## 2021-12-09 NOTE — PROGRESS NOTES
PROGRESS NOTE      Patient Name: Jacky Judd  : 1945  MRN: [id number]  Primary Care Physician: Reuben Puga MD  Date of admission: 2021    Patient Care Team:  Reuben Puga MD as PCP - General  Amairani Chopra DO (Family Medicine)        Subjective   Subjective:     Patient still complaining of significant back pain radiating to both legs.  Otherwise respiratory condition is much better shortness of breath and edema is much better Review of systems:  All other review of system unremarkable      Allergies:    Allergies   Allergen Reactions   • Sulfamethoxazole-Trimethoprim Rash   • Morphine And Related GI Intolerance     Nausea  Nausea         Objective   Exam:     Vital Signs  Temp:  [98.2 °F (36.8 °C)-98.8 °F (37.1 °C)] 98.4 °F (36.9 °C)  Heart Rate:  [] 109  Resp:  [16-21] 21  BP: (115-135)/(60-75) 135/72  SpO2:  [93 %-98 %] 95 %  on  Flow (L/min):  [2] 2;   Device (Oxygen Therapy): nasal cannula  Body mass index is 25.97 kg/m².    General: Elderly somewhat obese  male in no acute distress.    Head:      Normocephalic and atraumatic.    Eyes:      PERRL/EOM intact, conjunctiva and sclera clear with out nystagmus.    Neck:      No masses, thyromegaly,  trachea central with normal respiratory effort   Lungs:   Rhonchi bilaterally  to auscultation.    Heart:      Regular rate and rhythm, no murmur no gallop  Abd:        Soft, nontender, not distended, bowel sounds positive, no shifting dullness   Pulses:   Pulses palpable  Extr:        No cyanosis or clubbing--mild edema.    Neuro:    No focal deficits.   alert oriented x3  Skin:       Intact without lesions or rashes.    Psych:    Alert and cooperative; normal mood and affect; .      Results Review:  I have personally reviewed most recent Data :  CBC    Results from last 7 days   Lab Units 21  0655 21  0609 21  1846   WBC 10*3/mm3 7.50 6.80 8.60   HEMOGLOBIN g/dL 10.9* 11.8* 12.1*   PLATELETS 10*3/mm3 146 150 172      CMP   Results from last 7 days   Lab Units 12/09/21  0655 12/08/21  2336 12/08/21  1636 12/08/21  0609 12/07/21  1846   SODIUM mmol/L 127* 128* 126* 125* 120*   POTASSIUM mmol/L 4.7 4.9 4.8 4.7 4.7   CHLORIDE mmol/L 89* 90* 88* 86* 79*   CO2 mmol/L 34.0* 33.0* 32.0* 33.0* 32.0*   BUN mg/dL 11 12 12 15 17   CREATININE mg/dL 0.71* 0.75* 0.73* 0.74* 0.74*   GLUCOSE mg/dL 98 116* 122* 99 118*   ALBUMIN g/dL  --   --   --   --  4.00   BILIRUBIN mg/dL  --   --   --   --  0.4   ALK PHOS U/L  --   --   --   --  83   AST (SGOT) U/L  --   --   --   --  65*   ALT (SGPT) U/L  --   --   --   --  20     ABG      XR Chest 1 View    Result Date: 12/7/2021  1.Cardiomegaly. 2.Some vascular congestion not excluded. 3.Density left basilar area that could relate to some atelectasis.  Electronically Signed By-Scar Paul MD On:12/7/2021 7:35 PM This report was finalized on [id number] by  Scar Paul MD.        Scheduled Meds:atenolol, 12.5 mg, Oral, Daily  budesonide-formoterol, 2 puff, Inhalation, Nightly  bumetanide, 1 mg, Intravenous, BID  enoxaparin, 40 mg, Subcutaneous, Daily  isosorbide mononitrate, 60 mg, Oral, Daily  multivitamin with minerals, 1 tablet, Oral, Daily  pantoprazole, 40 mg, Oral, Daily  rosuvastatin, 10 mg, Oral, Nightly  senna, 2 tablet, Oral, BID  sodium chloride, 10 mL, Intravenous, Q12H      Continuous Infusions:   PRN Meds:•  acetaminophen **OR** acetaminophen **OR** acetaminophen  •  albuterol  •  aluminum-magnesium hydroxide-simethicone  •  calcium carbonate  •  cyclobenzaprine  •  magnesium sulfate **OR** magnesium sulfate in D5W 1g/100mL (PREMIX)  •  melatonin  •  Morphine  •  nitroglycerin  •  NON FORMULARY  •  ondansetron **OR** ondansetron  •  oxyCODONE  •  potassium chloride  •  potassium chloride  •  sodium chloride    Assessment/Plan   Assessment and Plan:         Hyponatremia    Acute on chronic congestive heart failure (HCC)    Benign essential hypertension    Bladder outlet obstruction     CHF (congestive heart failure) (HCC)    Coronary arteriosclerosis in native artery    Gastroesophageal reflux disease    Hip pain    Hypercholesterolemia    Low back pain    S/P CABG x 4    Right leg pain    ASSESSMENT:  · Hyponatremia: Likely etiology use of thiazide diuretics in the presence of some underlying lung condition may be some ADH effect.  · History of coronary artery disease with coronary artery bypass graft done  · Congestive heart failure echocardiogram results pending  · History of hypertension  · History of prostate issues           Plan:      · Sodium level is slowly getting better with loop diuretics.  · Still has mild edema BNP level is still elevated  · So far creatinine is stable.  · Follow-up with repeat labs tomorrow morning  · As urine sodium is less than 20 I do think thiazide diuretics most likely cause significant hyponatremia  · Thank you for letting me participate  · We will try to manage patient edema and volume status with loop diuretics and if needed depending upon the echocardiogram Aldactone can be considered avoid thiazide diuretics in the future    •         Electronically signed by Barber Barton MD,   [facility] kidney consultant  [phone]

## 2021-12-09 NOTE — SIGNIFICANT NOTE
12/09/21 0952   Recommendation   OT - Next Appointment 12/10/21  (Pt has orders to discharge today)

## 2021-12-09 NOTE — PROGRESS NOTES
[facility] Medicine Services Daily Progress Note    Patient Name: Jacky Judd  : 1945  MRN: [id number]  Primary Care Physician:  Reuben Puga MD  Date of admission: 2021      Subjective      Chief Complaint: Leg swelling      Patient Reports     2021: Complains of lower extremity edema.  Poor historian.  Cardiology consulted.  DC IV fluids because sodium correcting.  2021: Complains of right lower extremity pain.  Sodium improving      Review of Systems   All other systems reviewed and are negative.        Objective      Vitals:   Temp:  [98.2 °F (36.8 °C)-98.8 °F (37.1 °C)] 98.4 °F (36.9 °C)  Heart Rate:  [] 109  Resp:  [17-21] 21  BP: (115-135)/(60-75) 135/72  Flow (L/min):  [2] 2    Physical Exam  HENT:      Head: Normocephalic.      Mouth/Throat:      Mouth: Mucous membranes are moist.   Eyes:      General: No scleral icterus.     Extraocular Movements: Extraocular movements intact.      Pupils: Pupils are equal, round, and reactive to light.   Cardiovascular:      Rate and Rhythm: Normal rate and regular rhythm.   Pulmonary:      Effort: Pulmonary effort is normal.      Breath sounds: Examination of the right-lower field reveals rales. Examination of the left-lower field reveals rales. Rales present.   Abdominal:      General: Bowel sounds are normal.      Tenderness: There is no abdominal tenderness.   Musculoskeletal:      Cervical back: Normal range of motion.      Comments: Bilateral lower extremity pitting edema from toes to hips   Lymphadenopathy:      Cervical: No cervical adenopathy.   Skin:     General: Skin is warm.      Findings: No rash.   Neurological:      Mental Status: He is alert and oriented to person, place, and time.   Psychiatric:         Speech: Speech normal.         Behavior: Behavior is cooperative.         Thought Content: Thought content normal.          Result Review    Result Review:  I have personally reviewed the results  from the time of this admission to 12/9/2021 12:09 EST and agree with these findings:  [x]  Laboratory  []  Microbiology  [x]  Radiology  []  EKG/Telemetry   []  Cardiology/Vascular   []  Pathology  [x]  Old records  []  Other:            Assessment/Plan      Brief Patient Summary:  76-year-old male with history of CABG and presentation with lower extremity edema and hyponatremia      atenolol, 12.5 mg, Oral, Daily  budesonide-formoterol, 2 puff, Inhalation, Nightly  [START ON 12/10/2021] bumetanide, 1 mg, Intravenous, Daily  enoxaparin, 40 mg, Subcutaneous, Daily  isosorbide mononitrate, 60 mg, Oral, Daily  multivitamin with minerals, 1 tablet, Oral, Daily  pantoprazole, 40 mg, Oral, Daily  rosuvastatin, 10 mg, Oral, Nightly  senna, 2 tablet, Oral, BID  sodium chloride, 10 mL, Intravenous, Q12H             Active Hospital Problems:  Active Hospital Problems    Diagnosis    • **Hyponatremia    • Right leg pain    • CHF (congestive heart failure) (HCC)    • Bladder outlet obstruction      Formatting of this note might be different from the original.   bladder outlet obstruction 65/14 and put residual volume 400 mL     • Acute on chronic congestive heart failure (HCC)    • Hip pain    • Benign essential hypertension    • Coronary arteriosclerosis in native artery      Formatting of this note might be different from the original.  4/12  cath occluded vein grafts R CA&debo 90obtuse mar LAD&1st diag grafts open     • Gastroesophageal reflux disease    • S/P CABG x 4    • Low back pain    • Hypercholesterolemia      Suspected hypovolemic hyponatremia:  -Improved with IV fluids in the ED  -Avoid rapid sodium correction  -Patient on Lasix at home  -Consult nephrology    Bilateral lower extremity edema:   -Bilateral venous duplex ruled out  -TTE ordered  -Cardiology consulted    Benign essential hypertension:  -Continue lisinopril, Imdur     CAD s/p CABG:  -Cardiology consulted     Gastroesophageal reflux disease:  -Continue  Protonix     Low back pain/Hip pain:  -On chronic Percocet at home  -Added Flexeril              DVT prophylaxis:  Medical DVT prophylaxis orders are present.    CODE STATUS:    Medical Intervention Limits: NO intubation (DNI)  Level Of Support Discussed With: Patient  Code Status (Patient has no pulse and is not breathing): No CPR (Do Not Attempt to Resuscitate)  Medical Interventions (Patient has pulse or is breathing): Limited Support      Disposition:  I expect patient to be discharged defer.    This patient has been examined wearing appropriate Personal Protective Equipment and discussed with dragan. 12/09/21      Electronically signed by Thee Reilly DO, 12/09/21, 12:09 EST.  [facility] ([facility]ist [facility]

## 2021-12-09 NOTE — DISCHARGE PLACEMENT REQUEST
"Alejo Moore (76 y.o. Male)             Date of Birth Social Security Number Address Home Phone MRN    1945  [id number] [phone] [id number]    Yarsani Marital Status             Unknown Unknown       Admission Date Admission Type Admitting Provider Attending Provider Department, Room/Bed    12/7/21 Emergency Thee Reilly, Thee Baez,  [facility] 3C MEDICAL INPATIENT, 368/1    Discharge Date Discharge Disposition Discharge Destination                         Attending Provider: Thee Reilly DO    Allergies: Sulfamethoxazole-trimethoprim, Morphine And Related    Isolation: None   Infection: None   Code Status: No CPR   Advance Care Planning Activity    Ht: 170.2 cm (67\")   Wt: 75.2 kg (165 lb 12.8 oz)    Admission Cmt: None   Principal Problem: Hyponatremia [E87.1]                 Active Insurance as of 12/7/2021     Primary Coverage     Payor Plan Insurance Group Employer/Plan Group    MEDICARE MEDICARE A & B      Payor Plan Address Payor Plan Phone Number Payor Plan Fax Number Effective Dates    [address] [phone]  12/1/1990 - None Entered    [facility] [id number]       Subscriber Name Subscriber Birth Date Member ID       ALEJO MOORE 1945 [id number]           Secondary Coverage     Payor Plan Insurance Group Employer/Plan Group    [employer] SUP AAR HE[employer] CARE OPTIONS      Payor Plan Address Payor Plan Phone Number Payor Plan Fax Number Effective Dates    [facility] [phone]  1/1/2019 - None Entered    [address]       [facility] [address]       Subscriber Name Subscriber Birth Date Member ID       ALEJO MOORE 1945 [id number]                 Emergency Contacts      (Rel.) Home Phone Work Phone Mobile Phone    LISSA ORTEGA (Daughter) [phone] -- --              "

## 2021-12-09 NOTE — PLAN OF CARE
Goal Outcome Evaluation:  Plan of Care Reviewed With: patient        Progress: no change  Outcome Summary: Patient has complained of 10/10 pain throughout the entire shift. PRN meds given with no relief. Portillo remains in place; replaced bag today in able to get urine sample. Held diuretics d/t severe cramping in back and lower extremities. Neph believes it to be d/t back pain. Patient drinking plenty of fluids. Will continue to monitor.

## 2021-12-09 NOTE — PROGRESS NOTES
Referring Provider: Hospitalist    Reason for follow-up: Chest pain and shortness of breath     Patient Care Team:  Henry Blanco MD as PCP - General (Internal Medicine)  Amairani Chopra DO (Family Medicine)    Subjective .  Patient is doing well without much symptoms    Objective  Lying in bed comfortably     Review of Systems   Constitutional: Negative for fever and malaise/fatigue.   Cardiovascular: Negative for chest pain, dyspnea on exertion and palpitations.   Respiratory: Negative for cough and shortness of breath.    Skin: Negative for rash.   Gastrointestinal: Negative for abdominal pain, nausea and vomiting.   Neurological: Negative for focal weakness and headaches.   All other systems reviewed and are negative.      Sulfamethoxazole-trimethoprim and Morphine and related    Scheduled Meds:atenolol, 12.5 mg, Oral, Daily  budesonide-formoterol, 2 puff, Inhalation, Nightly  [START ON 12/10/2021] bumetanide, 1 mg, Intravenous, Daily  enoxaparin, 40 mg, Subcutaneous, Daily  isosorbide mononitrate, 60 mg, Oral, Daily  multivitamin with minerals, 1 tablet, Oral, Daily  pantoprazole, 40 mg, Oral, Daily  rosuvastatin, 10 mg, Oral, Nightly  senna, 2 tablet, Oral, BID  sodium chloride, 10 mL, Intravenous, Q12H      Continuous Infusions:   PRN Meds:.•  acetaminophen **OR** acetaminophen **OR** acetaminophen  •  albuterol  •  aluminum-magnesium hydroxide-simethicone  •  calcium carbonate  •  cyclobenzaprine  •  Ketamine/Gabapentin/Ibuprofen/Lidocaine/Baclofen Compound  •  magnesium sulfate **OR** magnesium sulfate in D5W 1g/100mL (PREMIX)  •  melatonin  •  Morphine  •  nitroglycerin  •  ondansetron **OR** ondansetron  •  oxyCODONE  •  potassium chloride  •  potassium chloride  •  sodium chloride        VITAL SIGNS  Vitals:    12/1945 12/09/21 0306 12/09/21 0839 12/09/21 1304   BP: 125/75 115/60 135/72 117/67   BP Location: Left arm Left arm Left arm Left arm   Patient Position: Lying Lying Lying    Pulse: 85  "87 109 86   Resp: 18 21 18   Temp: 98.8 °F (37.1 °C) 98.4 °F (36.9 °C)  97.9 °F (36.6 °C)   TempSrc: Oral Oral  Oral   SpO2: 97% 98% 95% 97%   Weight:  75.2 kg (165 lb 12.8 oz)     Height:           Flowsheet Rows      First Filed Value   Admission Height 167.6 cm (66\") Documented at 12/07/2021 1741   Admission Weight 81.6 kg (180 lb) Documented at 12/07/2021 1741           TELEMETRY: Sinus rhythm    Physical Exam:  Constitutional:       Appearance: Well-developed.   Eyes:      General: No scleral icterus.     Conjunctiva/sclera: Conjunctivae normal.   HENT:      Head: Normocephalic and atraumatic.   Neck:      Vascular: No carotid bruit or JVD.   Pulmonary:      Effort: Pulmonary effort is normal.      Breath sounds: Normal breath sounds. No wheezing. No rales.   Cardiovascular:      Normal rate. Regular rhythm.   Pulses:     Intact distal pulses.   Abdominal:      General: Bowel sounds are normal.      Palpations: Abdomen is soft.   Musculoskeletal:      Cervical back: Normal range of motion and neck supple. Skin:     General: Skin is warm and dry.      Findings: No rash.   Neurological:      Mental Status: Alert.          Results Review:   I reviewed the patient's new clinical results.  Lab Results (last 24 hours)     Procedure Component Value Units Date/Time    BNP [[id number]]  (Abnormal) Collected: 12/09/21 1434    Specimen: Blood Updated: 12/09/21 1551     proBNP 1,972.0 pg/mL     Narrative:      Among patients with dyspnea, NT-proBNP is highly sensitive for the detection of acute congestive heart failure. In addition NT-proBNP of <300 pg/ml effectively rules out acute congestive heart failure with 99% negative predictive value.    Results may be falsely decreased if patient taking Biotin.      Basic Metabolic Panel [[id number]]  (Abnormal) Collected: 12/09/21 1434    Specimen: Blood Updated: 12/09/21 1547     Glucose 104 mg/dL      BUN 13 mg/dL      Creatinine 0.74 mg/dL      Sodium 127 mmol/L      Potassium " 4.8 mmol/L      Chloride 89 mmol/L      CO2 34.0 mmol/L      Calcium 8.4 mg/dL      eGFR   Amer 124 mL/min/1.73      eGFR Non African Amer 102 mL/min/1.73      BUN/Creatinine Ratio 17.6     Anion Gap 4.0 mmol/L     Narrative:      GFR Normal >60  Chronic Kidney Disease <60  Kidney Failure <15      Protein / Creatinine Ratio, Urine - Urine, Clean Catch [[id number]] Collected: 12/09/21 1504    Specimen: Urine, Clean Catch Updated: 12/09/21 1546    Creatinine, Urine, Random - Urine, Clean Catch [[id number]] Collected: 12/09/21 1504    Specimen: Urine, Clean Catch Updated: 12/09/21 1546    Urinalysis, Microscopic Only - Urine, Clean Catch [[id number]]  (Abnormal) Collected: 12/09/21 1504    Specimen: Urine, Clean Catch Updated: 12/09/21 1525     RBC, UA Too Numerous to Count /HPF      WBC, UA Too Numerous to Count /HPF      Bacteria, UA None Seen /HPF      Squamous Epithelial Cells, UA None Seen /HPF      Hyaline Casts, UA 3-6 /LPF      Methodology Automated Microscopy    Urinalysis With Microscopic If Indicated (No Culture) - Urine, Clean Catch [[id number]]  (Abnormal) Collected: 12/09/21 1504    Specimen: Urine, Clean Catch Updated: 12/09/21 1525     Color, UA Yellow     Appearance, UA Hazy     Comment: Result checked         pH, UA 6.5     Specific Gravity, UA 1.013     Glucose, UA Negative     Ketones, UA Negative     Bilirubin, UA Negative     Blood, UA Large (3+)     Protein, UA 30 mg/dL (1+)     Leuk Esterase, UA Moderate (2+)     Nitrite, UA Negative     Urobilinogen, UA 0.2 E.U./dL    BNP [[id number]]  (Normal) Collected: 12/09/21 0655    Specimen: Blood Updated: 12/09/21 0746     proBNP 1,790.0 pg/mL     Narrative:      Among patients with dyspnea, NT-proBNP is highly sensitive for the detection of acute congestive heart failure. In addition NT-proBNP of <300 pg/ml effectively rules out acute congestive heart failure with 99% negative predictive value.    Results may be falsely decreased if patient taking  Biotin.      Basic Metabolic Panel [[id number]]  (Abnormal) Collected: 12/09/21 0655    Specimen: Blood Updated: 12/09/21 0745     Glucose 98 mg/dL      BUN 11 mg/dL      Creatinine 0.71 mg/dL      Sodium 127 mmol/L      Potassium 4.7 mmol/L      Chloride 89 mmol/L      CO2 34.0 mmol/L      Calcium 8.3 mg/dL      eGFR  African Amer 130 mL/min/1.73      eGFR Non African Amer 107 mL/min/1.73      BUN/Creatinine Ratio 15.5     Anion Gap 4.0 mmol/L     Narrative:      GFR Normal >60  Chronic Kidney Disease <60  Kidney Failure <15      CBC & Differential [[id number]]  (Abnormal) Collected: 12/09/21 0655    Specimen: Blood Updated: 12/09/21 0739    Narrative:      The following orders were created for panel order CBC & Differential.  Procedure                               Abnormality         Status                     ---------                               -----------         ------                     CBC Auto Differential[[id number]]        Abnormal            Final result                 Please view results for these tests on the individual orders.    CBC Auto Differential [[id number]]  (Abnormal) Collected: 12/09/21 0655    Specimen: Blood Updated: 12/09/21 0739     WBC 7.50 10*3/mm3      RBC 3.86 10*6/mm3      Hemoglobin 10.9 g/dL      Hematocrit 32.3 %      MCV 83.7 fL      MCH 28.3 pg      MCHC 33.9 g/dL      RDW 15.0 %      RDW-SD 44.2 fl      MPV 7.6 fL      Platelets 146 10*3/mm3      Neutrophil % 69.3 %      Lymphocyte % 15.6 %      Monocyte % 11.6 %      Eosinophil % 3.2 %      Basophil % 0.3 %      Neutrophils, Absolute 5.20 10*3/mm3      Lymphocytes, Absolute 1.20 10*3/mm3      Monocytes, Absolute 0.90 10*3/mm3      Eosinophils, Absolute 0.20 10*3/mm3      Basophils, Absolute 0.00 10*3/mm3      nRBC 0.1 /100 WBC     Basic Metabolic Panel [[id number]]  (Abnormal) Collected: 12/08/21 4576    Specimen: Blood Updated: 12/09/21 0018     Glucose 116 mg/dL      BUN 12 mg/dL      Creatinine 0.75 mg/dL      Sodium 128  mmol/L      Potassium 4.9 mmol/L      Chloride 90 mmol/L      CO2 33.0 mmol/L      Calcium 8.3 mg/dL      eGFR  African Amer 122 mL/min/1.73      eGFR Non African Amer 101 mL/min/1.73      BUN/Creatinine Ratio 16.0     Anion Gap 5.0 mmol/L     Narrative:      GFR Normal >60  Chronic Kidney Disease <60  Kidney Failure <15      Magnesium [[id number]]  (Normal) Collected: 12/08/21 2336    Specimen: Blood Updated: 12/09/21 0018     Magnesium 2.2 mg/dL     BNP [[id number]]  (Normal) Collected: 12/08/21 2336    Specimen: Blood Updated: 12/09/21 0016     proBNP 1,439.0 pg/mL     Narrative:      Among patients with dyspnea, NT-proBNP is highly sensitive for the detection of acute congestive heart failure. In addition NT-proBNP of <300 pg/ml effectively rules out acute congestive heart failure with 99% negative predictive value.    Results may be falsely decreased if patient taking Biotin.      BNP [[id number]]  (Normal) Collected: 12/08/21 1636    Specimen: Blood Updated: 12/08/21 1723     proBNP 936.4 pg/mL     Narrative:      Among patients with dyspnea, NT-proBNP is highly sensitive for the detection of acute congestive heart failure. In addition NT-proBNP of <300 pg/ml effectively rules out acute congestive heart failure with 99% negative predictive value.    Results may be falsely decreased if patient taking Biotin.      Basic Metabolic Panel [[id number]]  (Abnormal) Collected: 12/08/21 1636    Specimen: Blood Updated: 12/08/21 1719     Glucose 122 mg/dL      BUN 12 mg/dL      Creatinine 0.73 mg/dL      Sodium 126 mmol/L      Potassium 4.8 mmol/L      Chloride 88 mmol/L      CO2 32.0 mmol/L      Calcium 8.4 mg/dL      eGFR  African Amer 126 mL/min/1.73      eGFR Non African Amer 104 mL/min/1.73      BUN/Creatinine Ratio 16.4     Anion Gap 6.0 mmol/L     Narrative:      GFR Normal >60  Chronic Kidney Disease <60  Kidney Failure <15            Imaging Results (Last 24 Hours)     ** No results found for the last 24 hours. **           EKG      I personally viewed and interpreted the patient's EKG/Telemetry data:    ECHOCARDIOGRAM:    STRESS MYOVIEW:    CARDIAC CATHETERIZATION:    OTHER:         Assessment/Plan     Principal Problem:    Hyponatremia  Active Problems:    Acute on chronic congestive heart failure (HCC)    Benign essential hypertension    Bladder outlet obstruction    CHF (congestive heart failure) (HCC)    Coronary arteriosclerosis in native artery    Gastroesophageal reflux disease    Hip pain    Hypercholesterolemia    Low back pain    S/P CABG x 4    Right leg pain       Patient presents with chest pain or shortness of breath and is ruled out for MI by enzymes  Patient is having an echocardiogram and a stress Myoview study to rule out ischemia  Patient blood pressure heart rate stable  Patient's lipid levels are followed by the primary care doctor.    I discussed the patients findings and my recommendations with patient and nurse    Romulo Juares MD  12/09/21  16:53 EST

## 2021-12-09 NOTE — PROGRESS NOTES
Heart Failure Program  Nurse Navigator  Discharge Planning: Follow-up Note    Patient Name:Jacky Judd  :1945  Current Admission Date: 2021       Last 3 Weights:  Wt Readings from Last 3 Encounters:   21 75.2 kg (165 lb 12.8 oz)       Intake and Output totals: I/O last 3 completed shifts:  In: 980 [P.O.:480; IV Piggyback:500]  Out: 4900 [Urine:4900]  I/O this shift:  In: 300 [P.O.:300]  Out: -           Patient Assessment:   Patient was in a lot of pain his nurse was gettign him some pain medication       Patient Education:   discussed s/s, sodium intake, diuretics     Review HF Education provided to patient:  yes-Symptoms worsening  yes-Prescribed medications  yes-HF self-care  yes-Follow-up Appointments       Acceptance of learning: Patient was accepting but will reinforce due to pain he was unable to focus     Heart Failure education interactive teaching session time: 20 minutes      Identified needs/barriers:       Intervention follow-up:

## 2021-12-10 ENCOUNTER — READMISSION MANAGEMENT (OUTPATIENT)
Dept: CALL CENTER | Facility: HOSPITAL | Age: 76
End: 2021-12-10

## 2021-12-10 VITALS
RESPIRATION RATE: 20 BRPM | HEIGHT: 67 IN | HEART RATE: 106 BPM | DIASTOLIC BLOOD PRESSURE: 61 MMHG | OXYGEN SATURATION: 98 % | WEIGHT: 196 LBS | TEMPERATURE: 98.2 F | BODY MASS INDEX: 30.76 KG/M2 | SYSTOLIC BLOOD PRESSURE: 114 MMHG

## 2021-12-10 LAB
ANION GAP SERPL CALCULATED.3IONS-SCNC: 4 MMOL/L (ref 5–15)
BASOPHILS # BLD AUTO: 0.1 10*3/MM3 (ref 0–0.2)
BASOPHILS NFR BLD AUTO: 0.6 % (ref 0–1.5)
BUN SERPL-MCNC: 13 MG/DL (ref 8–23)
BUN/CREAT SERPL: 17.1 (ref 7–25)
CALCIUM SPEC-SCNC: 8.3 MG/DL (ref 8.6–10.5)
CHLORIDE SERPL-SCNC: 88 MMOL/L (ref 98–107)
CO2 SERPL-SCNC: 33 MMOL/L (ref 22–29)
CREAT SERPL-MCNC: 0.76 MG/DL (ref 0.76–1.27)
DEPRECATED RDW RBC AUTO: 44.6 FL (ref 37–54)
EOSINOPHIL # BLD AUTO: 0.3 10*3/MM3 (ref 0–0.4)
EOSINOPHIL NFR BLD AUTO: 2.8 % (ref 0.3–6.2)
ERYTHROCYTE [DISTWIDTH] IN BLOOD BY AUTOMATED COUNT: 15.1 % (ref 12.3–15.4)
GFR SERPL CREATININE-BSD FRML MDRD: 120 ML/MIN/1.73
GFR SERPL CREATININE-BSD FRML MDRD: 99 ML/MIN/1.73
GLUCOSE SERPL-MCNC: 108 MG/DL (ref 65–99)
HCT VFR BLD AUTO: 32.1 % (ref 37.5–51)
HGB BLD-MCNC: 10.8 G/DL (ref 13–17.7)
LYMPHOCYTES # BLD AUTO: 1.1 10*3/MM3 (ref 0.7–3.1)
LYMPHOCYTES NFR BLD AUTO: 11.8 % (ref 19.6–45.3)
MAGNESIUM SERPL-MCNC: 2 MG/DL (ref 1.6–2.4)
MCH RBC QN AUTO: 28 PG (ref 26.6–33)
MCHC RBC AUTO-ENTMCNC: 33.6 G/DL (ref 31.5–35.7)
MCV RBC AUTO: 83.4 FL (ref 79–97)
MONOCYTES # BLD AUTO: 1 10*3/MM3 (ref 0.1–0.9)
MONOCYTES NFR BLD AUTO: 10.6 % (ref 5–12)
NEUTROPHILS NFR BLD AUTO: 7 10*3/MM3 (ref 1.7–7)
NEUTROPHILS NFR BLD AUTO: 74.2 % (ref 42.7–76)
NRBC BLD AUTO-RTO: 0 /100 WBC (ref 0–0.2)
NT-PROBNP SERPL-MCNC: 2327 PG/ML (ref 0–1800)
NT-PROBNP SERPL-MCNC: 2532 PG/ML (ref 0–1800)
NT-PROBNP SERPL-MCNC: 2604 PG/ML (ref 0–1800)
PLATELET # BLD AUTO: 150 10*3/MM3 (ref 140–450)
PMV BLD AUTO: 7.3 FL (ref 6–12)
POTASSIUM SERPL-SCNC: 4.9 MMOL/L (ref 3.5–5.2)
RBC # BLD AUTO: 3.85 10*6/MM3 (ref 4.14–5.8)
SODIUM SERPL-SCNC: 125 MMOL/L (ref 136–145)
WBC NRBC COR # BLD: 9.4 10*3/MM3 (ref 3.4–10.8)

## 2021-12-10 PROCEDURE — 97530 THERAPEUTIC ACTIVITIES: CPT

## 2021-12-10 PROCEDURE — 97110 THERAPEUTIC EXERCISES: CPT

## 2021-12-10 PROCEDURE — 97535 SELF CARE MNGMENT TRAINING: CPT

## 2021-12-10 PROCEDURE — 36415 COLL VENOUS BLD VENIPUNCTURE: CPT | Performed by: NURSE PRACTITIONER

## 2021-12-10 PROCEDURE — 99232 SBSQ HOSP IP/OBS MODERATE 35: CPT | Performed by: INTERNAL MEDICINE

## 2021-12-10 PROCEDURE — 83880 ASSAY OF NATRIURETIC PEPTIDE: CPT | Performed by: NURSE PRACTITIONER

## 2021-12-10 PROCEDURE — 99238 HOSP IP/OBS DSCHRG MGMT 30/<: CPT | Performed by: HOSPITALIST

## 2021-12-10 PROCEDURE — 85025 COMPLETE CBC W/AUTO DIFF WBC: CPT | Performed by: NURSE PRACTITIONER

## 2021-12-10 PROCEDURE — 80048 BASIC METABOLIC PNL TOTAL CA: CPT | Performed by: NURSE PRACTITIONER

## 2021-12-10 PROCEDURE — 25010000002 MORPHINE PER 10 MG: Performed by: HOSPITALIST

## 2021-12-10 PROCEDURE — 83735 ASSAY OF MAGNESIUM: CPT | Performed by: NURSE PRACTITIONER

## 2021-12-10 PROCEDURE — 97112 NEUROMUSCULAR REEDUCATION: CPT

## 2021-12-10 RX ORDER — MORPHINE SULFATE 2 MG/ML
2 INJECTION, SOLUTION INTRAMUSCULAR; INTRAVENOUS EVERY 4 HOURS PRN
Status: DISCONTINUED | OUTPATIENT
Start: 2021-12-10 | End: 2021-12-10 | Stop reason: HOSPADM

## 2021-12-10 RX ORDER — POLYETHYLENE GLYCOL 3350 17 G/17G
17 POWDER, FOR SOLUTION ORAL DAILY
Status: DISCONTINUED | OUTPATIENT
Start: 2021-12-10 | End: 2021-12-10 | Stop reason: HOSPADM

## 2021-12-10 RX ORDER — OXYCODONE AND ACETAMINOPHEN 10; 325 MG/1; MG/1
1 TABLET ORAL 4 TIMES DAILY
Qty: 10 TABLET | Refills: 0 | Status: SHIPPED | OUTPATIENT
Start: 2021-12-10

## 2021-12-10 RX ADMIN — MORPHINE SULFATE 2 MG: 2 INJECTION, SOLUTION INTRAMUSCULAR; INTRAVENOUS at 08:19

## 2021-12-10 RX ADMIN — MORPHINE SULFATE 2 MG: 2 INJECTION, SOLUTION INTRAMUSCULAR; INTRAVENOUS at 14:15

## 2021-12-10 RX ADMIN — Medication 15 G: at 08:51

## 2021-12-10 RX ADMIN — ATENOLOL 12.5 MG: 25 TABLET ORAL at 08:18

## 2021-12-10 RX ADMIN — SODIUM CHLORIDE, PRESERVATIVE FREE 10 ML: 5 INJECTION INTRAVENOUS at 08:19

## 2021-12-10 RX ADMIN — BUMETANIDE 1 MG: 0.25 INJECTION INTRAMUSCULAR; INTRAVENOUS at 08:19

## 2021-12-10 RX ADMIN — POLYETHYLENE GLYCOL 3350 17 G: 17 POWDER, FOR SOLUTION ORAL at 08:51

## 2021-12-10 RX ADMIN — STANDARDIZED SENNA CONCENTRATE 2 TABLET: 8.6 TABLET ORAL at 08:18

## 2021-12-10 RX ADMIN — MULTIPLE VITAMINS W/ MINERALS TAB 1 TABLET: TAB at 08:18

## 2021-12-10 RX ADMIN — ISOSORBIDE MONONITRATE 60 MG: 60 TABLET, EXTENDED RELEASE ORAL at 08:18

## 2021-12-10 RX ADMIN — OXYCODONE 10 MG: 5 TABLET ORAL at 08:18

## 2021-12-10 RX ADMIN — CYCLOBENZAPRINE 5 MG: 10 TABLET, FILM COATED ORAL at 08:18

## 2021-12-10 RX ADMIN — OXYCODONE 10 MG: 5 TABLET ORAL at 14:15

## 2021-12-10 RX ADMIN — PANTOPRAZOLE SODIUM 40 MG: 40 TABLET, DELAYED RELEASE ORAL at 08:18

## 2021-12-10 NOTE — THERAPY TREATMENT NOTE
Subjective: Pt agreeable to therapeutic plan of care.    Objective:     Bed mobility - Max-A and Assist x 2  Transfers- not attempted   Ambulation - not attempted  Therapeutic Exercises- completed 10 reps of AP, LAQ, hip flexion, GS.  Required min A for sting balance when completing LAQs and hip flexion exercises.     Pain: 8 VAS R hip  Education: Provided education on importance of mobility and skilled verbal / tactile cueing throughout intervention.     Assessment: Pt presents with functional mobility impairments which indicate the need for skilled intervention.  Pt required max A x 2 for bed mobility this date and completed BLE strengthening exercises in sitting and reaching outside of JAYCEE with BUE while sitting EOB.  Pt required cga/min A for dynamic sitting balance this date.   Tolerating session today without incident. Recommend inpt rehab.  Will continue to follow and progress as tolerated.     Plan/Recommendations:   Pt would benefit from Inpatient Rehabilitation placement at discharge from facility   Pt desires Inpatient Rehabilitation placement at discharge. Pt cooperative; agreeable to therapeutic recommendations and plan of care.     Basic Mobility 6-click:  Rollin = Total, A lot = 2, A little = 3; 4 = None  Supine>Sit:   1 = Total, A lot = 2, A little = 3; 4 = None   Sit>Stand with arms:  1 = Total, A lot = 2, A little = 3; 4 = None  Bed>Chair:   1 = Total, A lot = 2, A little = 3; 4 = None  Ambulate in room:  1 = Total, A lot = 2, A little = 3; 4 = None  3-5 Steps with railin = Total, A lot = 2, A little = 3; 4 = None  Score: 8        Post-Tx Position: Supine with HOB Elevated, Alarms activated and Call light and personal items within reach  PPE: gloves, surgical mask, eyewear protection

## 2021-12-10 NOTE — PLAN OF CARE
Goal Outcome Evaluation:  Plan of Care Reviewed With: patient        Progress: no change  Outcome Summary: Patient to be discharged to [place] via EMS. Neph wants labs done in 1 week.

## 2021-12-10 NOTE — NURSING NOTE
"Pt agitated and yelling at primary nurse.  Charge nurse spoke to pt.  He said he was not getting correct dosage of pain medicine and the med list we have is wrong.  CN went over all meds listed on MAR.  Pt agreed meds are correct.  Pt said \" I can take my pain medicine 4 times a day or more if I need it.\"  As of now it is scheduled 4 times per day.  CN explained to pt meds can only be given as ordered and would talk to MD.  Hospitalist notified.  "

## 2021-12-10 NOTE — NURSING NOTE
Patient refused to go to [facility]. Called daughter to discuss and she said he does that every time he is admitted to the hospital. Stated that was fine and she would come to pick him up. MD and CM notified.

## 2021-12-10 NOTE — NURSING NOTE
Patient is very argumentive this morning about his medications. Patient states we are not giving him his medications correctly. Charge nurse went in and went over all the patient's medications and patient agreed they were correct but we were not giving them correctly. Patient also admitted to taking their pain medications more often at home. We educated patient on why we do not do that. Patient is threatening to leave AMA.

## 2021-12-10 NOTE — PLAN OF CARE
Goal Outcome Evaluation:  Plan of Care Reviewed With: patient        Progress: no change  Outcome Summary: Pt resting abed quietly. Requiring IV pain medication for pain control. No s/s of distress noted. WIll cont to monitor

## 2021-12-10 NOTE — PROGRESS NOTES
[facility] Medicine Services Daily Progress Note    Patient Name: Jacky Judd  : 1945  MRN: [id number]  Primary Care Physician:  Henry Blanco MD  Date of admission: 2021      Subjective      Chief Complaint: Leg swelling      Patient Reports     2021: Complains of lower extremity edema.  Poor historian.  Cardiology consulted.  DC IV fluids because sodium correcting.  2021: Complains of right lower extremity pain.  Sodium improving  21: Chronic back pain.  Constipation.  Will start on MiraLAX and senna S.  Wife is in rehab.  Lives with daughter and grandkids      Review of Systems   All other systems reviewed and are negative.        Objective      Vitals:   Temp:  [97.9 °F (36.6 °C)-98.3 °F (36.8 °C)] 98.3 °F (36.8 °C)  Heart Rate:  [] 115  Resp:  [18-21] 20  BP: (117-149)/(63-70) 149/63  Flow (L/min):  [2] 2    Physical Exam  HENT:      Head: Normocephalic.      Mouth/Throat:      Mouth: Mucous membranes are moist.   Eyes:      General: No scleral icterus.     Extraocular Movements: Extraocular movements intact.      Pupils: Pupils are equal, round, and reactive to light.   Cardiovascular:      Rate and Rhythm: Normal rate and regular rhythm.   Pulmonary:      Effort: Pulmonary effort is normal.      Breath sounds: Examination of the right-lower field reveals rales. Examination of the left-lower field reveals rales. Rales present.   Abdominal:      General: Bowel sounds are normal.      Tenderness: There is no abdominal tenderness.   Musculoskeletal:      Cervical back: Normal range of motion.      Comments: Bilateral lower extremity pitting edema from toes to hips   Lymphadenopathy:      Cervical: No cervical adenopathy.   Skin:     General: Skin is warm.      Findings: No rash.   Neurological:      Mental Status: He is alert and oriented to person, place, and time.   Psychiatric:         Speech: Speech normal.         Behavior: Behavior is  cooperative.         Thought Content: Thought content normal.          Result Review    Result Review:  I have personally reviewed the results from the time of this admission to 12/10/2021 11:27 EST and agree with these findings:  [x]  Laboratory  []  Microbiology  [x]  Radiology  []  EKG/Telemetry   []  Cardiology/Vascular   []  Pathology  [x]  Old records  []  Other:            Assessment/Plan      Brief Patient Summary:  76-year-old male with history of CABG and presentation with lower extremity edema and hyponatremia      atenolol, 12.5 mg, Oral, Daily  budesonide-formoterol, 2 puff, Inhalation, Nightly  bumetanide, 1 mg, Intravenous, Daily  enoxaparin, 40 mg, Subcutaneous, Daily  isosorbide mononitrate, 60 mg, Oral, Daily  multivitamin with minerals, 1 tablet, Oral, Daily  pantoprazole, 40 mg, Oral, Daily  polyethylene glycol, 17 g, Oral, Daily  rosuvastatin, 10 mg, Oral, Nightly  senna, 2 tablet, Oral, BID  sodium chloride, 10 mL, Intravenous, Q12H  Urea, 15 g, Oral, TID             Active Hospital Problems:  Active Hospital Problems    Diagnosis    • **Hyponatremia    • Right leg pain    • CHF (congestive heart failure) (HCC)    • Bladder outlet obstruction      Formatting of this note might be different from the original.   bladder outlet obstruction 65/14 and put residual volume 400 mL     • Acute on chronic congestive heart failure (HCC)    • Hip pain    • Benign essential hypertension    • Coronary arteriosclerosis in native artery      Formatting of this note might be different from the original.  4/12  cath occluded vein grafts R CA&debo 90obtuse mar LAD&1st diag grafts open     • Gastroesophageal reflux disease    • S/P CABG x 4    • Low back pain    • Hypercholesterolemia      Suspected hypovolemic hyponatremia:  -Improved with IV fluids in the ED  -Avoid rapid sodium correction  -Patient on Lasix at home  -Consult nephrology    Bilateral lower extremity edema:   -Bilateral venous duplex ruled  out  -TTE ordered  -Cardiology consulted    Benign essential hypertension:  -Continue lisinopril, Imdur     CAD s/p CABG:  -Cardiology consulted     Gastroesophageal reflux disease:  -Continue Protonix     Low back pain/Hip pain:  -On chronic Percocet at home  -Added Flexeril              DVT prophylaxis:  Medical DVT prophylaxis orders are present.    CODE STATUS:    Medical Intervention Limits: NO intubation (DNI)  Level Of Support Discussed With: Patient  Code Status (Patient has no pulse and is not breathing): No CPR (Do Not Attempt to Resuscitate)  Medical Interventions (Patient has pulse or is breathing): Limited Support      Disposition:  I expect patient to be discharged defer.    This patient has been examined wearing appropriate Personal Protective Equipment and discussed with dragan. 12/10/21      Electronically signed by Thee Reilly DO, 12/10/21, 11:27 EST.  [facility]ist [facility]

## 2021-12-10 NOTE — PROGRESS NOTES
PROGRESS NOTE      Patient Name: Jacky Judd  : 1945  MRN: [id number]  Primary Care Physician: Henry Blanco MD  Date of admission: 2021    Patient Care Team:  Henry Blanco MD as PCP - General (Internal Medicine)  Amairani Chopra DO (Family Medicine)        Subjective   Subjective:     Patient still complaining of significant back pain radiating to both legs.  Otherwise respiratory condition is much better shortness of breath and edema is much better Review of systems:  All other review of system unremarkable      Allergies:    Allergies   Allergen Reactions   • Sulfamethoxazole-Trimethoprim Rash   • Morphine And Related GI Intolerance     Nausea  Nausea         Objective   Exam:     Vital Signs  Temp:  [97.9 °F (36.6 °C)-98.3 °F (36.8 °C)] 98.3 °F (36.8 °C)  Heart Rate:  [] 115  Resp:  [18-21] 20  BP: (117-149)/(63-70) 149/63  SpO2:  [97 %] 97 %  on  Flow (L/min):  [2] 2;   Device (Oxygen Therapy): nasal cannula  Body mass index is 30.7 kg/m².    General: Elderly somewhat obese  male in no acute distress.    Head:      Normocephalic and atraumatic.    Eyes:      PERRL/EOM intact, conjunctiva and sclera clear with out nystagmus.    Neck:      No masses, thyromegaly,  trachea central with normal respiratory effort   Lungs:   Rhonchi bilaterally  to auscultation.    Heart:      Regular rate and rhythm, no murmur no gallop  Abd:        Soft, nontender, not distended, bowel sounds positive, no shifting dullness   Pulses:   Pulses palpable  Extr:        No cyanosis or clubbing--mild edema.    Neuro:    No focal deficits.   alert oriented x3  Skin:       Intact without lesions or rashes.    Psych:    Alert and cooperative; normal mood and affect; .      Results Review:  I have personally reviewed most recent Data :  CBC    Results from last 7 days   Lab Units 12/10/21  0605 21  0655 21  0609 21  1846   WBC 10*3/mm3 9.40 7.50 6.80 8.60   HEMOGLOBIN g/dL 10.8* 10.9*  11.8* 12.1*   PLATELETS 10*3/mm3 150 146 150 172     CMP   Results from last 7 days   Lab Units 12/10/21  0605 12/09/21  2258 12/09/21  1844 12/09/21  1434 12/09/21  0655 12/08/21  2336 12/08/21  1636 12/08/21  0609 12/07/21  1846   SODIUM mmol/L 125* 125* 128* 127* 127* 128* 126*   < > 120*   POTASSIUM mmol/L 4.9 4.7 4.9 4.8 4.7 4.9 4.8   < > 4.7   CHLORIDE mmol/L 88* 88* 90* 89* 89* 90* 88*   < > 79*   CO2 mmol/L 33.0* 32.0* 31.0* 34.0* 34.0* 33.0* 32.0*   < > 32.0*   BUN mg/dL 13 14 14 13 11 12 12   < > 17   CREATININE mg/dL 0.76 0.78 0.80 0.74* 0.71* 0.75* 0.73*   < > 0.74*   GLUCOSE mg/dL 108* 124* 123* 104* 98 116* 122*   < > 118*   ALBUMIN g/dL  --   --   --   --   --   --   --   --  4.00   BILIRUBIN mg/dL  --   --   --   --   --   --   --   --  0.4   ALK PHOS U/L  --   --   --   --   --   --   --   --  83   AST (SGOT) U/L  --   --   --   --   --   --   --   --  65*   ALT (SGPT) U/L  --   --   --   --   --   --   --   --  20    < > = values in this interval not displayed.     ABG      No radiology results for the last day    Results for orders placed during the hospital encounter of 12/07/21    Adult Transthoracic Echo Complete w/ Color, Spectral and Contrast if necessary per protocol    Interpretation Summary  · Left ventricular ejection fraction appears to be 66 - 70%.  · No pericardial effusion noted    Scheduled Meds:atenolol, 12.5 mg, Oral, Daily  budesonide-formoterol, 2 puff, Inhalation, Nightly  bumetanide, 1 mg, Intravenous, Daily  enoxaparin, 40 mg, Subcutaneous, Daily  isosorbide mononitrate, 60 mg, Oral, Daily  multivitamin with minerals, 1 tablet, Oral, Daily  pantoprazole, 40 mg, Oral, Daily  polyethylene glycol, 17 g, Oral, Daily  rosuvastatin, 10 mg, Oral, Nightly  senna, 2 tablet, Oral, BID  sodium chloride, 10 mL, Intravenous, Q12H  Urea, 15 g, Oral, TID      Continuous Infusions:   PRN Meds:•  acetaminophen **OR** acetaminophen **OR** acetaminophen  •  albuterol  •  aluminum-magnesium  hydroxide-simethicone  •  calcium carbonate  •  cyclobenzaprine  •  Ketamine/Gabapentin/Ibuprofen/Lidocaine/Baclofen Compound  •  magnesium sulfate **OR** magnesium sulfate in D5W 1g/100mL (PREMIX)  •  melatonin  •  Morphine  •  nitroglycerin  •  ondansetron **OR** ondansetron  •  oxyCODONE  •  potassium chloride  •  potassium chloride  •  sodium chloride    Assessment/Plan   Assessment and Plan:         Hyponatremia    Acute on chronic congestive heart failure (HCC)    Benign essential hypertension    Bladder outlet obstruction    CHF (congestive heart failure) ([facility])    Coronary arteriosclerosis in native artery    Gastroesophageal reflux disease    Hip pain    Hypercholesterolemia    Low back pain    S/P CABG x 4    Right leg pain    ASSESSMENT:  · Hyponatremia: Likely etiology use of thiazide diuretics in the presence of some underlying lung condition may be some ADH effect.  · History of coronary artery disease with coronary artery bypass graft done  · Congestive heart failure echocardiogram results pending  · History of hypertension  · History of prostate issues  · Normal echocardiogram           Plan:      · Sodium level dropped again  · Continue diuretics  · We will start some urea today  · Patient edema and respiratory condition is a lot better  · But still complaining of back pain  · Follow-up with repeat labs tomorrow morning  · As urine sodium is less than 20 I do think thiazide diuretics most likely cause significant hyponatremia in addition to that other contributing factor likely patient underlying lung disease as sodium level dropped again we will repeat urine studies  · May be acute back pain might be contributing to some SIADH  · Thank you for letting me participate  · We will try to manage patient edema and volume status with loop diuretics and if needed depending upon the echocardiogram Aldactone can be considered avoid thiazide diuretics in the future    •         Electronically signed by Barber  MD Oralia,   [facility] kidney consultant  [phone]

## 2021-12-10 NOTE — PLAN OF CARE
Pt tolerated OT session well, agreeable to work with OT. Pt limited by back pain, strength, endurance, and balance. Pt req max Ax2 to sit EOB, tolerated sitting EOB x1-12 minutes with SBA for static sitting and MIN A for dynamic sitting balance. Pt req cues to lean forward for safety. Pt completed BUE therex sitting EOB. Cont OT POC. REC IP rehab at [facility] as pt is a high falls risk and not safe for home.    Ana Mitchell, OTD, OTR

## 2021-12-10 NOTE — CASE MANAGEMENT/SOCIAL WORK
Continued Stay Note   [facility]     Patient Name: Jacky Judd  MRN: [id number]  Today's Date: 12/10/2021    Admit Date: 12/7/2021     Discharge Plan     Row Name 12/10/21 1607       Plan    Plan DC plan: return home.    Plan Comments CM received confirmation to [place] through liaison. MD and nursing notified. Awaiting cardio/nephro clearance. CM notified by nursing that patient didn't want to go to [place], wanted to go home. Nursing notified daughter and is having someone pick him up. CM notified liaison.    Final Discharge Disposition Code 01 - home or self-care              Expected Discharge Date and Time     Expected Discharge Date Expected Discharge Time    Dec 10, 2021         Phone communication or documentation only - no physical contact with patient or family.      Megan Naegele, RN      Office Phone: [phone]  Office Cell: [phone]

## 2021-12-10 NOTE — NURSING NOTE
"Patient complaining of significant back pain radiating down both lower extremities. Suggested to patient to trying turning onto his sides. Educated patient pain meds are not due for another hour. Patient yelled at nurse and stated, \"Just go. Leave me alone.\"  "

## 2021-12-10 NOTE — PROGRESS NOTES
Referring Provider: Hospitalist    Reason for follow-up: Chest pain and shortness of breath     Patient Care Team:  Henry Blanco MD as PCP - General (Internal Medicine)  Amairani Chopra DO (Family Medicine)    Subjective .  Patient is doing well without much symptoms    Objective  Lying in bed comfortably     Review of Systems   Constitutional: Negative for fever and malaise/fatigue.   Cardiovascular: Negative for chest pain, dyspnea on exertion and palpitations.   Respiratory: Negative for cough and shortness of breath.    Skin: Negative for rash.   Gastrointestinal: Negative for abdominal pain, nausea and vomiting.   Neurological: Negative for focal weakness and headaches.   All other systems reviewed and are negative.      Sulfamethoxazole-trimethoprim and Morphine and related    Scheduled Meds:atenolol, 12.5 mg, Oral, Daily  budesonide-formoterol, 2 puff, Inhalation, Nightly  bumetanide, 1 mg, Intravenous, Daily  enoxaparin, 40 mg, Subcutaneous, Daily  isosorbide mononitrate, 60 mg, Oral, Daily  multivitamin with minerals, 1 tablet, Oral, Daily  pantoprazole, 40 mg, Oral, Daily  polyethylene glycol, 17 g, Oral, Daily  rosuvastatin, 10 mg, Oral, Nightly  senna, 2 tablet, Oral, BID  sodium chloride, 10 mL, Intravenous, Q12H  Urea, 15 g, Oral, TID      Continuous Infusions:   PRN Meds:.•  acetaminophen **OR** acetaminophen **OR** acetaminophen  •  albuterol  •  aluminum-magnesium hydroxide-simethicone  •  calcium carbonate  •  cyclobenzaprine  •  Ketamine/Gabapentin/Ibuprofen/Lidocaine/Baclofen Compound  •  magnesium sulfate **OR** magnesium sulfate in D5W 1g/100mL (PREMIX)  •  melatonin  •  Morphine  •  nitroglycerin  •  ondansetron **OR** ondansetron  •  oxyCODONE  •  potassium chloride  •  potassium chloride  •  sodium chloride        VITAL SIGNS  Vitals:    12/09/21 1946 12/10/21 0344 12/10/21 0818 12/10/21 1141   BP:  130/70 149/63 114/61   BP Location:   Right arm Right arm   Patient Position:   Lying  "Lying   Pulse:  107 115 106   Resp: 18 20  20   Temp:  98.3 °F (36.8 °C)  98.2 °F (36.8 °C)   TempSrc:  Oral  Oral   SpO2: 97% 97%  98%   Weight:  88.9 kg (196 lb)     Height:           Flowsheet Rows      First Filed Value   Admission Height 167.6 cm (66\") Documented at 12/07/2021 1741   Admission Weight 81.6 kg (180 lb) Documented at 12/07/2021 1741           TELEMETRY: Sinus rhythm    Physical Exam:  Constitutional:       Appearance: Well-developed.   Eyes:      General: No scleral icterus.     Conjunctiva/sclera: Conjunctivae normal.   HENT:      Head: Normocephalic and atraumatic.   Neck:      Vascular: No carotid bruit or JVD.   Pulmonary:      Effort: Pulmonary effort is normal.      Breath sounds: Normal breath sounds. No wheezing. No rales.   Cardiovascular:      Normal rate. Regular rhythm.   Pulses:     Intact distal pulses.   Abdominal:      General: Bowel sounds are normal.      Palpations: Abdomen is soft.   Musculoskeletal:      Cervical back: Normal range of motion and neck supple. Skin:     General: Skin is warm and dry.      Findings: No rash.   Neurological:      Mental Status: Alert.          Results Review:   I reviewed the patient's new clinical results.  Lab Results (last 24 hours)     Procedure Component Value Units Date/Time    BNP [[id number]]  (Abnormal) Collected: 12/10/21 0941    Specimen: Blood Updated: 12/10/21 1039     proBNP 2,604.0 pg/mL     Narrative:      Among patients with dyspnea, NT-proBNP is highly sensitive for the detection of acute congestive heart failure. In addition NT-proBNP of <300 pg/ml effectively rules out acute congestive heart failure with 99% negative predictive value.    Results may be falsely decreased if patient taking Biotin.      Basic Metabolic Panel [[id number]]  (Abnormal) Collected: 12/10/21 0605    Specimen: Blood Updated: 12/10/21 0643     Glucose 108 mg/dL      BUN 13 mg/dL      Creatinine 0.76 mg/dL      Sodium 125 mmol/L      Potassium 4.9 mmol/L     "  Chloride 88 mmol/L      CO2 33.0 mmol/L      Calcium 8.3 mg/dL      eGFR  African Amer 120 mL/min/1.73      eGFR Non African Amer 99 mL/min/1.73      BUN/Creatinine Ratio 17.1     Anion Gap 4.0 mmol/L     Narrative:      GFR Normal >60  Chronic Kidney Disease <60  Kidney Failure <15      Magnesium [[id number]]  (Normal) Collected: 12/10/21 0605    Specimen: Blood Updated: 12/10/21 0643     Magnesium 2.0 mg/dL     BNP [[id number]]  (Abnormal) Collected: 12/10/21 0605    Specimen: Blood Updated: 12/10/21 0640     proBNP 2,532.0 pg/mL     Narrative:      Among patients with dyspnea, NT-proBNP is highly sensitive for the detection of acute congestive heart failure. In addition NT-proBNP of <300 pg/ml effectively rules out acute congestive heart failure with 99% negative predictive value.    Results may be falsely decreased if patient taking Biotin.      CBC & Differential [[id number]]  (Abnormal) Collected: 12/10/21 0251    Specimen: Blood Updated: 12/10/21 0618    Narrative:      The following orders were created for panel order CBC & Differential.  Procedure                               Abnormality         Status                     ---------                               -----------         ------                     CBC Auto Differential[[id number]]        Abnormal            Final result               Scan Slide[[id number]]                                                                    Please view results for these tests on the individual orders.    CBC Auto Differential [[id number]]  (Abnormal) Collected: 12/10/21 0605    Specimen: Blood Updated: 12/10/21 0618     WBC 9.40 10*3/mm3      RBC 3.85 10*6/mm3      Hemoglobin 10.8 g/dL      Hematocrit 32.1 %      MCV 83.4 fL      MCH 28.0 pg      MCHC 33.6 g/dL      RDW 15.1 %      RDW-SD 44.6 fl      MPV 7.3 fL      Platelets 150 10*3/mm3      Neutrophil % 74.2 %      Lymphocyte % 11.8 %      Monocyte % 10.6 %      Eosinophil % 2.8 %      Basophil % 0.6 %       Neutrophils, Absolute 7.00 10*3/mm3      Lymphocytes, Absolute 1.10 10*3/mm3      Monocytes, Absolute 1.00 10*3/mm3      Eosinophils, Absolute 0.30 10*3/mm3      Basophils, Absolute 0.10 10*3/mm3      nRBC 0.0 /100 WBC     BNP [[id number]]  (Abnormal) Collected: 12/10/21 0251    Specimen: Blood Updated: 12/10/21 0328     proBNP 2,327.0 pg/mL     Narrative:      Among patients with dyspnea, NT-proBNP is highly sensitive for the detection of acute congestive heart failure. In addition NT-proBNP of <300 pg/ml effectively rules out acute congestive heart failure with 99% negative predictive value.    Results may be falsely decreased if patient taking Biotin.      Basic Metabolic Panel [[id number]]  (Abnormal) Collected: 12/09/21 2258    Specimen: Blood Updated: 12/09/21 2331     Glucose 124 mg/dL      BUN 14 mg/dL      Creatinine 0.78 mg/dL      Sodium 125 mmol/L      Potassium 4.7 mmol/L      Chloride 88 mmol/L      CO2 32.0 mmol/L      Calcium 8.3 mg/dL      eGFR  African Amer 117 mL/min/1.73      eGFR Non African Amer 96 mL/min/1.73      BUN/Creatinine Ratio 17.9     Anion Gap 5.0 mmol/L     Narrative:      GFR Normal >60  Chronic Kidney Disease <60  Kidney Failure <15      BNP [[id number]]  (Abnormal) Collected: 12/09/21 2258    Specimen: Blood Updated: 12/09/21 2328     proBNP 2,242.0 pg/mL     Narrative:      Among patients with dyspnea, NT-proBNP is highly sensitive for the detection of acute congestive heart failure. In addition NT-proBNP of <300 pg/ml effectively rules out acute congestive heart failure with 99% negative predictive value.    Results may be falsely decreased if patient taking Biotin.      Protein / Creatinine Ratio, Urine - Urine, Clean Catch [[id number]]  (Abnormal) Collected: 12/09/21 1504    Specimen: Urine, Clean Catch Updated: 12/09/21 2047     Protein/Creatinine Ratio, Urine 667.5 mg/G Crea      Creatinine, Urine 79.4 mg/dL      Total Protein, Urine 53.0 mg/dL     Creatinine, Urine, Random -  Urine, Clean Catch [[id number]] Collected: 12/09/21 1504    Specimen: Urine, Clean Catch Updated: 12/09/21 2042     Creatinine, Urine 79.4 mg/dL     Narrative:      Reference intervals for random urine have not been established.  Clinical usage is dependent upon physician's interpretation in combination with other laboratory tests.       BNP [[id number]]  (Abnormal) Collected: 12/09/21 1844    Specimen: Blood Updated: 12/09/21 1921     proBNP 2,017.0 pg/mL     Narrative:      Among patients with dyspnea, NT-proBNP is highly sensitive for the detection of acute congestive heart failure. In addition NT-proBNP of <300 pg/ml effectively rules out acute congestive heart failure with 99% negative predictive value.    Results may be falsely decreased if patient taking Biotin.      Basic Metabolic Panel [[id number]]  (Abnormal) Collected: 12/09/21 1844    Specimen: Blood Updated: 12/09/21 1918     Glucose 123 mg/dL      BUN 14 mg/dL      Creatinine 0.80 mg/dL      Sodium 128 mmol/L      Potassium 4.9 mmol/L      Chloride 90 mmol/L      CO2 31.0 mmol/L      Calcium 8.6 mg/dL      eGFR   Amer 113 mL/min/1.73      eGFR Non African Amer 93 mL/min/1.73      BUN/Creatinine Ratio 17.5     Anion Gap 7.0 mmol/L     Narrative:      GFR Normal >60  Chronic Kidney Disease <60  Kidney Failure <15      BNP [[id number]]  (Abnormal) Collected: 12/09/21 1434    Specimen: Blood Updated: 12/09/21 1551     proBNP 1,972.0 pg/mL     Narrative:      Among patients with dyspnea, NT-proBNP is highly sensitive for the detection of acute congestive heart failure. In addition NT-proBNP of <300 pg/ml effectively rules out acute congestive heart failure with 99% negative predictive value.    Results may be falsely decreased if patient taking Biotin.      Basic Metabolic Panel [[id number]]  (Abnormal) Collected: 12/09/21 1434    Specimen: Blood Updated: 12/09/21 1547     Glucose 104 mg/dL      BUN 13 mg/dL      Creatinine 0.74 mg/dL      Sodium 127  mmol/L      Potassium 4.8 mmol/L      Chloride 89 mmol/L      CO2 34.0 mmol/L      Calcium 8.4 mg/dL      eGFR   Amer 124 mL/min/1.73      eGFR Non African Amer 102 mL/min/1.73      BUN/Creatinine Ratio 17.6     Anion Gap 4.0 mmol/L     Narrative:      GFR Normal >60  Chronic Kidney Disease <60  Kidney Failure <15      Urinalysis, Microscopic Only - Urine, Clean Catch [[id number]]  (Abnormal) Collected: 12/09/21 1504    Specimen: Urine, Clean Catch Updated: 12/09/21 1525     RBC, UA Too Numerous to Count /HPF      WBC, UA Too Numerous to Count /HPF      Bacteria, UA None Seen /HPF      Squamous Epithelial Cells, UA None Seen /HPF      Hyaline Casts, UA 3-6 /LPF      Methodology Automated Microscopy    Urinalysis With Microscopic If Indicated (No Culture) - Urine, Clean Catch [[id number]]  (Abnormal) Collected: 12/09/21 1504    Specimen: Urine, Clean Catch Updated: 12/09/21 1525     Color, UA Yellow     Appearance, UA Hazy     Comment: Result checked         pH, UA 6.5     Specific Gravity, UA 1.013     Glucose, UA Negative     Ketones, UA Negative     Bilirubin, UA Negative     Blood, UA Large (3+)     Protein, UA 30 mg/dL (1+)     Leuk Esterase, UA Moderate (2+)     Nitrite, UA Negative     Urobilinogen, UA 0.2 E.U./dL          Imaging Results (Last 24 Hours)     ** No results found for the last 24 hours. **          EKG      I personally viewed and interpreted the patient's EKG/Telemetry data:    ECHOCARDIOGRAM:    STRESS MYOVIEW:    CARDIAC CATHETERIZATION:    OTHER:         Assessment/Plan     Principal Problem:    Hyponatremia  Active Problems:    Acute on chronic congestive heart failure (HCC)    Benign essential hypertension    Bladder outlet obstruction    CHF (congestive heart failure) ([facility])    Coronary arteriosclerosis in native artery    Gastroesophageal reflux disease    Hip pain    Hypercholesterolemia    Low back pain    S/P CABG x 4    Right leg pain       Patient presents with chest pain or  shortness of breath and is ruled out for MI by enzymes  Patient had an echocardiogram which showed normal LV systolic function  Patient be followed in my office and will have a stress mostly at that time.  Patient blood pressure heart rate stable  Patient's lipid levels are followed by the primary care doctor.    I discussed the patients findings and my recommendations with patient and nurse    Romulo Juares MD  12/10/21  15:53 EST

## 2021-12-10 NOTE — PLAN OF CARE
Pt presents with functional mobility impairments which indicate the need for skilled intervention.  Pt required max A x 2 for bed mobility this date and completed BLE strengthening exercises in sitting and reaching outside of JAYCEE with BUE while sitting EOB.  Pt required cga/min A for dynamic sitting balance this date.   Tolerating session today without incident. Recommend inpt rehab.  Will continue to follow and progress as tolerated.

## 2021-12-10 NOTE — THERAPY TREATMENT NOTE
Subjective: Pt agreeable to therapeutic plan of care. RN ok'd tx.Per RN pt with inappropriate comments this AM. Pt stating that he has been mean this AM.    Cognition: A&Ox3, fair insight into deficits. Pt able to be redirected during session to stay on task.    Objective:   Bed Mobility: Max-A, Assist x 2 and utilizing compensatory strategy. Pt completed static and dynamic reaching tasks reaching outside JAYCEE with min A.   Functional Transfers: not appropriate      Lower Body Dressing: Dependent  ADL Position: supine  ADL Comments: donning socks    Toileting: ayala    Ther Ex: Pt completed BUE ther ex in all planes including shoulder flex/ex,  elbow flex/ext x10 to increase endurance and strength in prep for ADLs and transfers. pt req cues for activity pacing and technique. pt educated on performing 2-3x/day to increase strength and endurance.       Pain: 9/10 in back and hips    Education: Provided education on importance of mobility and skilled verbal / tactile cueing throughout intervention.     Assessment: Pt tolerated OT session well, agreeable to work with OT. Pt limited by back pain, strength, endurance, and balance. Pt req max Ax2 to sit EOB, tolerated sitting EOB x1-12 minutes with SBA for static sitting and MIN A for dynamic sitting balance. Pt req cues to lean forward for safety. Pt completed BUE therex sitting EOB. Cont OT POC. REC IP rehab at [place] as pt is a high falls risk and not safe for home.       Modified Daniels: 5 = Severe disability (Requires constant nursing care and attention, bedridden, incontinent)    Post-Tx Position: Supine with HOB Elevated, bed in chair position and BUE elevated on pillows. Bed alarm on.

## 2021-12-10 NOTE — NURSING NOTE
Patient was told multiple times by day shift nurse yesterday and night shift nurse that they be NPO after midnight for Myoview this morning. Patient was caught drinking pepsi this morning; patient will not be having Myoview today.

## 2021-12-11 PROBLEM — E87.1 HYPONATREMIA: Status: RESOLVED | Noted: 2021-12-08 | Resolved: 2021-12-11

## 2021-12-11 NOTE — OUTREACH NOTE
Prep Survey      Responses   [facility] facility patient discharged from? [facility]   Is LACE score < 7 ? No   Emergency Room discharge w/ pulse ox? No   Eligibility Readm Mgmt   Discharge diagnosis Hyponatremia   Does the patient have one of the following disease processes/diagnoses(primary or secondary)? CHF   Does the patient have Home health ordered? No   Is there a DME ordered? No   Comments regarding appointments Follow up with Reuben Puga MD and Henry PEREZ MD   Medication alerts for this patient Zaroxolyn   Prep survey completed? Yes          Yohana Manley RN

## 2021-12-11 NOTE — DISCHARGE SUMMARY
[facility] Medicine Services  DISCHARGE SUMMARY    Patient Name: Jacky Judd  : 1945  MRN: [id number]    Date of Admission: 2021  Date of Discharge:  2021  Primary Care Physician: Henry Blanco MD      Presenting Problem:   Hyponatremia [E87.1]  Acute on chronic congestive heart failure, unspecified heart failure type (HCC) [I50.9]  Coronary artery disease involving native coronary artery of native heart, unspecified whether angina present [I25.10]    Active and Resolved Hospital Problems:  Active Hospital Problems    Diagnosis POA   • **Hyponatremia [E87.1] Yes   • Right leg pain [M79.604] Unknown   • CHF (congestive heart failure) (HCC) [I50.9] Yes   • Bladder outlet obstruction [N32.0] Yes   • Acute on chronic congestive heart failure (HCC) [I50.9] Yes   • Hip pain [M25.559] Yes   • Benign essential hypertension [I10] Yes   • Coronary arteriosclerosis in native artery [I25.10] Yes   • Gastroesophageal reflux disease [K21.9] Yes   • S/P CABG x 4 [Z95.1] Not Applicable   • Low back pain [M54.50] Yes   • Hypercholesterolemia [E78.00] Yes      Resolved Hospital Problems   No resolved problems to display.         Hospital Course     Hospital Course:      The patient is a pleasant 76-year-old male with history of CABG, back surgery, hypertension, chronic back pain and gait dysfunction that presented to the emergency room on 2021 complaining of bilateral lower extremity edema.  In the ED, the patient complained of mostly right leg pain in association with muscle spasms.  Apparently his home Lasix dose then changed to once daily from twice daily.    ED work-up showed chest x-ray with vascular congestion.  Lower extremity venous duplex showed chronic right lower extremity superficial thrombophlebitis in the small saphenous vein.      The patient was then admitted to the medical floor.  Hyponatremia improved with IV fluids and nephrology was consulted for  assistance.  Right leg pain was deemed acute on chronic due to chronic back pain. TTE on 12/7/2021 showed LVEF 66-70%.  He was evaluated by cardiology.  The patient was discharged to rehab on 12/10/2021.    Problem list addressed during the hospitalization    Suspected hypovolemic hyponatremia: Resolved  -Improved with IV fluids in the ED  -Patient on Lasix at home  -Consulted nephrology     Bilateral lower extremity edema:   -Bilateral venous duplex 12/9/2021 ruled out DVT  -TTE 12/9/2021--> LVEF 66-70%.  -Cardiology consulted     Benign essential hypertension:  -Continue lisinopril, Imdur     CAD s/p CABG:  -Denied chest pain  -Cardiology consulted     Gastroesophageal reflux disease:  -Continue Protonix     Low back pain/Hip pain complicated with right lower extremity pain:  -Continue home Percocet and Flexeril       DISCHARGE Follow Up Recommendations for labs and diagnostics:       Reasons For Change In Medications and Indications for New Medications:      Day of Discharge     Vital Signs:       Physical Exam:  Physical Exam  HENT:      Head: Normocephalic.      Nose: Nose normal.   Eyes:      Extraocular Movements: Extraocular movements intact.      Pupils: Pupils are equal, round, and reactive to light.   Cardiovascular:      Rate and Rhythm: Normal rate and regular rhythm.   Pulmonary:      Effort: Pulmonary effort is normal.   Abdominal:      General: Bowel sounds are normal.   Musculoskeletal:         General: Normal range of motion.      Cervical back: Normal range of motion.   Skin:     General: Skin is warm.   Neurological:      General: No focal deficit present.      Mental Status: He is alert and oriented to person, place, and time.   Psychiatric:         Mood and Affect: Mood normal.            Pertinent  and/or Most Recent Results     LAB RESULTS:      Lab 12/10/21  0605 12/09/21  0655 12/08/21  0609 12/07/21  1846   WBC 9.40 7.50 6.80 8.60   HEMOGLOBIN 10.8* 10.9* 11.8* 12.1*   HEMATOCRIT 32.1*  32.3* 33.7* 34.1*   PLATELETS 150 146 150 172   NEUTROS ABS 7.00 5.20 5.10 6.50   LYMPHS ABS 1.10 1.20 0.90 1.20   MONOS ABS 1.00* 0.90 0.50 0.60   EOS ABS 0.30 0.20 0.20 0.30   MCV 83.4 83.7 82.0 81.2         Lab 12/10/21  0605 12/09/21  2258 12/09/21  1844 12/09/21  1434 12/09/21  0655 12/08/21  2336 12/08/21  2336 12/08/21  1636 12/08/21  0609 12/07/21  1846 12/07/21  1846   SODIUM 125* 125* 128* 127* 127*   < > 128*   < > 125*   < > 120*   POTASSIUM 4.9 4.7 4.9 4.8 4.7   < > 4.9   < > 4.7   < > 4.7   CHLORIDE 88* 88* 90* 89* 89*   < > 90*   < > 86*   < > 79*   CO2 33.0* 32.0* 31.0* 34.0* 34.0*   < > 33.0*   < > 33.0*   < > 32.0*   ANION GAP 4.0* 5.0 7.0 4.0* 4.0*   < > 5.0   < > 6.0   < > 9.0   BUN 13 14 14 13 11   < > 12   < > 15   < > 17   CREATININE 0.76 0.78 0.80 0.74* 0.71*   < > 0.75*   < > 0.74*   < > 0.74*   GLUCOSE 108* 124* 123* 104* 98   < > 116*   < > 99   < > 118*   CALCIUM 8.3* 8.3* 8.6 8.4* 8.3*   < > 8.3*   < > 8.1*   < > 8.7   MAGNESIUM 2.0  --   --   --   --   --  2.2  --  2.2  --  2.1   HEMOGLOBIN A1C  --   --   --   --   --   --   --   --  5.8*  --   --     < > = values in this interval not displayed.         Lab 12/07/21  1846   TOTAL PROTEIN 7.5   ALBUMIN 4.00   GLOBULIN 3.5   ALT (SGPT) 20   AST (SGOT) 65*   BILIRUBIN 0.4   ALK PHOS 83         Lab 12/10/21  0941 12/10/21  0605 12/10/21  0251 12/09/21  2258 12/09/21  1844 12/08/21  1636 12/08/21  0609 12/07/21  1846 12/07/21  1846   PROBNP 2,604.0* 2,532.0* 2,327.0* 2,242.0* 2,017.0*   < > 574.5   < > 492.9   TROPONIN T  --   --   --   --   --   --  0.015  --  0.011    < > = values in this interval not displayed.         Lab 12/07/21  1846   CHOLESTEROL 137   LDL CHOL 65   HDL CHOL 44   TRIGLYCERIDES 167*             Brief Urine Lab Results  (Last result in the past 365 days)      Color   Clarity   Blood   Leuk Est   Nitrite   Protein   CREAT   Urine HCG        12/09/21 1504             79.4         12/09/21 1504             79.4          12/09/21 1504 Yellow   Hazy  Comment:  Result checked    Large (3+)   Moderate (2+)   Negative   30 mg/dL (1+)               Microbiology Results (last 10 days)     Procedure Component Value - Date/Time    COVID PRE-OP / PRE-PROCEDURE SCREENING ORDER (NO ISOLATION) - Swab, Nasopharynx [[id number]]  (Normal) Collected: 12/08/21 0147    Lab Status: Final result Specimen: Swab from Nasopharynx Updated: 12/08/21 0236    Narrative:      The following orders were created for panel order COVID PRE-OP / PRE-PROCEDURE SCREENING ORDER (NO ISOLATION) - Swab, Nasopharynx.  Procedure                               Abnormality         Status                     ---------                               -----------         ------                     COVID-19,CEPHEID/DANIEL/BD...[[id number]]  Normal              Final result                 Please view results for these tests on the individual orders.    COVID-19,CEPHEID/DANIEL/BDMAX,COR/OANH/PAD/[facility] IN-HOUSE(OR EMERGENT/ADD-ON),NP SWAB IN TRANSPORT MEDIA 3-4 HR TAT, RT-PCR - Swab, Nasopharynx [[id number]]  (Normal) Collected: 12/08/21 0147    Lab Status: Final result Specimen: Swab from Nasopharynx Updated: 12/08/21 0236     COVID19 Not Detected    Narrative:      Fact sheet for providers: https://www.fda.gov/media/784599/download     Fact sheet for patients: https://www.fda.gov/media/279402/download  Fact sheet for providers: https://www.fda.gov/media/897690/download     Fact sheet for patients: https://www.fda.gov/media/577824/download          XR Chest 1 View    Result Date: 12/7/2021  Impression: 1.Cardiomegaly. 2.Some vascular congestion not excluded. 3.Density left basilar area that could relate to some atelectasis.  Electronically Signed By-Scar Paul MD On:12/7/2021 7:35 PM This report was finalized on [id number] by  Scar Paul MD.      Results for orders placed during the hospital encounter of 12/07/21    Duplex Venous Lower Extremity - Bilateral CAR    Interpretation  Summary  · Chronic right lower extremity superficial thrombophlebitis noted in the small saphenous.  · All other veins appeared normal bilaterally.      Results for orders placed during the hospital encounter of 12/07/21    Duplex Venous Lower Extremity - Bilateral CAR    Interpretation Summary  · Chronic right lower extremity superficial thrombophlebitis noted in the small saphenous.  · All other veins appeared normal bilaterally.      Results for orders placed during the hospital encounter of 12/07/21    Adult Transthoracic Echo Complete w/ Color, Spectral and Contrast if necessary per protocol    Interpretation Summary  · Left ventricular ejection fraction appears to be 66 - 70%.  · No pericardial effusion noted      Labs Pending at Discharge:  Pending Labs     Order Current Status    ADH In process          Procedures Performed           Consults:   Consults     Date and Time Order Name Status Description    12/8/2021  2:57 AM Inpatient Cardiology Consult Completed             Discharge Details        Discharge Medications      Continue These Medications      Instructions Start Date   albuterol (2.5 MG/3ML) 0.083% nebulizer solution  Commonly known as: PROVENTIL   2.5 mg, Nebulization, Every 8 Hours PRN      atenolol 25 MG tablet  Commonly known as: TENORMIN   12.5 mg, Oral, Daily      Breo Ellipta 100-25 MCG/INH inhaler  Generic drug: Fluticasone Furoate-Vilanterol   1 puff, Inhalation, Daily - RT      budesonide-formoterol 160-4.5 MCG/ACT inhaler  Commonly known as: SYMBICORT   2 puffs, Inhalation, Nightly      clopidogrel 75 MG tablet  Commonly known as: PLAVIX   75 mg, Oral, Daily      Crestor 10 MG tablet  Generic drug: rosuvastatin   10 mg, Oral, Daily      cyclobenzaprine 5 MG tablet  Commonly known as: FLEXERIL   5 mg, Oral, 3 Times Daily PRN      furosemide 40 MG tablet  Commonly known as: LASIX   40 mg, Oral, Every Morning      furosemide 40 MG tablet  Commonly known as: LASIX   20 mg, Oral, Nightly  PRN      isosorbide mononitrate 60 MG 24 hr tablet  Commonly known as: IMDUR   60 mg, Oral, Daily      MiraLax 17 g packet  Generic drug: polyethylene glycol   17 g, Oral, 3 Times Daily PRN      multivitamin with minerals tablet tablet   1 tablet, Oral, Daily      NON FORMULARY   4 Times Daily PRN, Gabapentin/Ketamine lotion compound. Apply to the left breast four times daily.      ondansetron 4 MG tablet  Commonly known as: ZOFRAN   4 mg, Oral, Every 8 Hours PRN      oxyCODONE-acetaminophen  MG per tablet  Commonly known as: PERCOCET   1 tablet, Oral, 4 Times Daily      pantoprazole 40 MG EC tablet  Commonly known as: PROTONIX   40 mg, Oral, Daily      senna 8.6 MG tablet  Commonly known as: SENOKOT   2 tablets, Oral, 2 Times Daily      Vitamin D3 25 MCG (1000 UT) capsule   1,000 Units, Oral, Daily         Stop These Medications    metOLazone 5 MG tablet  Commonly known as: ZAROXOLYN            Allergies   Allergen Reactions   • Sulfamethoxazole-Trimethoprim Rash   • Morphine And Related GI Intolerance     Nausea  Nausea           Discharge Disposition:   Home or Self Care    Diet:  Hospital:No active diet order        Discharge Activity: as tolerated      Discharge Condition: stable      CODE STATUS:  Code Status and Medical Interventions:   Ordered at: 12/07/21 7418     Medical Intervention Limits:    NO intubation (DNI)     Level Of Support Discussed With:    Patient     Code Status (Patient has no pulse and is not breathing):    No CPR (Do Not Attempt to Resuscitate)     Medical Interventions (Patient has pulse or is breathing):    Limited Support         No future appointments.    Additional Instructions for the Follow-ups that You Need to Schedule     Discharge Follow-up with PCP   As directed       Currently Documented PCP:    Henry Blanco MD    PCP Phone Number:    [phone]     Follow Up Details: 2 weeks               Time spent on Discharge including face to face service:  15  minutes    This patient has been examined wearing appropriate Personal Protective Equipment and discussed with nursing. 12/11/21      Signature: Electronically signed by Thee Reilly DO, 12/11/21, 3:51 PM EST.

## 2021-12-14 ENCOUNTER — READMISSION MANAGEMENT (OUTPATIENT)
Dept: CALL CENTER | Facility: HOSPITAL | Age: 76
End: 2021-12-14

## 2021-12-14 NOTE — OUTREACH NOTE
CHF Week 1 Survey      Responses   [facility] patient discharged from? [facility]   Does the patient have one of the following disease processes/diagnoses(primary or secondary)? CHF   CHF Week 1 attempt successful? Yes   Call start time 1542   Revoke Decline to participate  [Reached daughter who reports that she does not live close to patient and patient does not talk on the phone. ]   Call end time 1544   Discharge diagnosis Hyponatremia, CHF   Person spoke with today (if not patient) and relationship Natalie Gutierrez, daughter          Jayna Moreno RN

## 2021-12-29 LAB — VASOPRESSIN SERPL-MCNC: NORMAL NG/L

## 2022-04-13 ENCOUNTER — HOSPITAL ENCOUNTER (EMERGENCY)
Facility: HOSPITAL | Age: 77
Discharge: HOME | End: 2022-04-13
Attending: EMERGENCY MEDICINE | Admitting: EMERGENCY MEDICINE

## 2022-04-13 ENCOUNTER — APPOINTMENT (OUTPATIENT)
Dept: CT IMAGING | Facility: HOSPITAL | Age: 77
End: 2022-04-13

## 2022-04-13 ENCOUNTER — APPOINTMENT (OUTPATIENT)
Dept: GENERAL RADIOLOGY | Facility: HOSPITAL | Age: 77
End: 2022-04-13

## 2022-04-13 VITALS
TEMPERATURE: 98 F | RESPIRATION RATE: 20 BRPM | DIASTOLIC BLOOD PRESSURE: 60 MMHG | HEART RATE: 101 BPM | HEIGHT: 68 IN | SYSTOLIC BLOOD PRESSURE: 149 MMHG | OXYGEN SATURATION: 93 % | WEIGHT: 196 LBS | BODY MASS INDEX: 29.7 KG/M2

## 2022-04-13 DIAGNOSIS — K59.00 CONSTIPATION, UNSPECIFIED CONSTIPATION TYPE: Primary | ICD-10-CM

## 2022-04-13 DIAGNOSIS — R10.84 GENERALIZED ABDOMINAL PAIN: ICD-10-CM

## 2022-04-13 LAB
ALBUMIN SERPL-MCNC: 4 G/DL (ref 3.5–5.2)
ALBUMIN/GLOB SERPL: 1.1 G/DL
ALP SERPL-CCNC: 70 U/L (ref 39–117)
ALT SERPL W P-5'-P-CCNC: 19 U/L (ref 1–41)
AMMONIA BLD-SCNC: 17 UMOL/L (ref 16–60)
ANION GAP SERPL CALCULATED.3IONS-SCNC: 9 MMOL/L (ref 5–15)
AST SERPL-CCNC: 34 U/L (ref 1–40)
BACTERIA UR QL AUTO: ABNORMAL /HPF
BASOPHILS # BLD AUTO: 0.1 10*3/MM3 (ref 0–0.2)
BASOPHILS NFR BLD AUTO: 1.8 % (ref 0–1.5)
BILIRUB SERPL-MCNC: 0.2 MG/DL (ref 0–1.2)
BILIRUB UR QL STRIP: NEGATIVE
BUN SERPL-MCNC: 15 MG/DL (ref 8–23)
BUN/CREAT SERPL: 15.2 (ref 7–25)
CALCIUM SPEC-SCNC: 9.2 MG/DL (ref 8.6–10.5)
CHLORIDE SERPL-SCNC: 93 MMOL/L (ref 98–107)
CLARITY UR: CLEAR
CO2 SERPL-SCNC: 37 MMOL/L (ref 22–29)
COLOR UR: YELLOW
CREAT SERPL-MCNC: 0.99 MG/DL (ref 0.76–1.27)
DEPRECATED RDW RBC AUTO: 45.1 FL (ref 37–54)
EGFRCR SERPLBLD CKD-EPI 2021: 77.5 ML/MIN/1.73
EOSINOPHIL # BLD AUTO: 0.3 10*3/MM3 (ref 0–0.4)
EOSINOPHIL NFR BLD AUTO: 3.9 % (ref 0.3–6.2)
ERYTHROCYTE [DISTWIDTH] IN BLOOD BY AUTOMATED COUNT: 14.9 % (ref 12.3–15.4)
GLOBULIN UR ELPH-MCNC: 3.7 GM/DL
GLUCOSE SERPL-MCNC: 112 MG/DL (ref 65–99)
GLUCOSE UR STRIP-MCNC: NEGATIVE MG/DL
HCT VFR BLD AUTO: 35.4 % (ref 37.5–51)
HGB BLD-MCNC: 11.8 G/DL (ref 13–17.7)
HGB UR QL STRIP.AUTO: NEGATIVE
HYALINE CASTS UR QL AUTO: ABNORMAL /LPF
KETONES UR QL STRIP: ABNORMAL
LEUKOCYTE ESTERASE UR QL STRIP.AUTO: ABNORMAL
LIPASE SERPL-CCNC: 28 U/L (ref 13–60)
LYMPHOCYTES # BLD AUTO: 1.2 10*3/MM3 (ref 0.7–3.1)
LYMPHOCYTES NFR BLD AUTO: 15.1 % (ref 19.6–45.3)
MAGNESIUM SERPL-MCNC: 2.5 MG/DL (ref 1.6–2.4)
MCH RBC QN AUTO: 28 PG (ref 26.6–33)
MCHC RBC AUTO-ENTMCNC: 33.4 G/DL (ref 31.5–35.7)
MCV RBC AUTO: 83.7 FL (ref 79–97)
MONOCYTES # BLD AUTO: 0.6 10*3/MM3 (ref 0.1–0.9)
MONOCYTES NFR BLD AUTO: 7.3 % (ref 5–12)
NEUTROPHILS NFR BLD AUTO: 5.6 10*3/MM3 (ref 1.7–7)
NEUTROPHILS NFR BLD AUTO: 71.9 % (ref 42.7–76)
NITRITE UR QL STRIP: NEGATIVE
NRBC BLD AUTO-RTO: 0.1 /100 WBC (ref 0–0.2)
NT-PROBNP SERPL-MCNC: 534 PG/ML (ref 0–1800)
PH UR STRIP.AUTO: <=5 [PH] (ref 5–8)
PLATELET # BLD AUTO: 176 10*3/MM3 (ref 140–450)
PMV BLD AUTO: 7.2 FL (ref 6–12)
POTASSIUM SERPL-SCNC: 4.6 MMOL/L (ref 3.5–5.2)
PROT SERPL-MCNC: 7.7 G/DL (ref 6–8.5)
PROT UR QL STRIP: ABNORMAL
RBC # BLD AUTO: 4.22 10*6/MM3 (ref 4.14–5.8)
RBC # UR STRIP: ABNORMAL /HPF
REF LAB TEST METHOD: ABNORMAL
SARS-COV-2 RNA PNL SPEC NAA+PROBE: NOT DETECTED
SODIUM SERPL-SCNC: 139 MMOL/L (ref 136–145)
SP GR UR STRIP: 1.03 (ref 1–1.03)
SQUAMOUS #/AREA URNS HPF: ABNORMAL /HPF
TROPONIN T SERPL-MCNC: 0.03 NG/ML (ref 0–0.03)
UROBILINOGEN UR QL STRIP: ABNORMAL
WBC # UR STRIP: ABNORMAL /HPF
WBC NRBC COR # BLD: 7.7 10*3/MM3 (ref 3.4–10.8)

## 2022-04-13 PROCEDURE — 25010000002 HYDROMORPHONE 1 MG/ML SOLUTION: Performed by: PHYSICIAN ASSISTANT

## 2022-04-13 PROCEDURE — 81001 URINALYSIS AUTO W/SCOPE: CPT | Performed by: PHYSICIAN ASSISTANT

## 2022-04-13 PROCEDURE — 71260 CT THORAX DX C+: CPT

## 2022-04-13 PROCEDURE — 0 IOPAMIDOL PER 1 ML: Performed by: EMERGENCY MEDICINE

## 2022-04-13 PROCEDURE — 93005 ELECTROCARDIOGRAM TRACING: CPT | Performed by: PHYSICIAN ASSISTANT

## 2022-04-13 PROCEDURE — 82140 ASSAY OF AMMONIA: CPT | Performed by: PHYSICIAN ASSISTANT

## 2022-04-13 PROCEDURE — 74177 CT ABD & PELVIS W/CONTRAST: CPT

## 2022-04-13 PROCEDURE — 80053 COMPREHEN METABOLIC PANEL: CPT | Performed by: PHYSICIAN ASSISTANT

## 2022-04-13 PROCEDURE — 96374 THER/PROPH/DIAG INJ IV PUSH: CPT

## 2022-04-13 PROCEDURE — 99284 EMERGENCY DEPT VISIT MOD MDM: CPT

## 2022-04-13 PROCEDURE — 87635 SARS-COV-2 COVID-19 AMP PRB: CPT | Performed by: PHYSICIAN ASSISTANT

## 2022-04-13 PROCEDURE — 85025 COMPLETE CBC W/AUTO DIFF WBC: CPT | Performed by: PHYSICIAN ASSISTANT

## 2022-04-13 PROCEDURE — 71045 X-RAY EXAM CHEST 1 VIEW: CPT

## 2022-04-13 PROCEDURE — 94799 UNLISTED PULMONARY SVC/PX: CPT

## 2022-04-13 PROCEDURE — 87086 URINE CULTURE/COLONY COUNT: CPT | Performed by: PHYSICIAN ASSISTANT

## 2022-04-13 PROCEDURE — 84484 ASSAY OF TROPONIN QUANT: CPT | Performed by: PHYSICIAN ASSISTANT

## 2022-04-13 PROCEDURE — 83690 ASSAY OF LIPASE: CPT | Performed by: PHYSICIAN ASSISTANT

## 2022-04-13 PROCEDURE — 83880 ASSAY OF NATRIURETIC PEPTIDE: CPT | Performed by: PHYSICIAN ASSISTANT

## 2022-04-13 PROCEDURE — 83735 ASSAY OF MAGNESIUM: CPT | Performed by: PHYSICIAN ASSISTANT

## 2022-04-13 PROCEDURE — 94640 AIRWAY INHALATION TREATMENT: CPT

## 2022-04-13 RX ORDER — ALBUTEROL SULFATE 1.25 MG/3ML
1.25 SOLUTION RESPIRATORY (INHALATION) ONCE
Status: COMPLETED | OUTPATIENT
Start: 2022-04-13 | End: 2022-04-13

## 2022-04-13 RX ORDER — SODIUM CHLORIDE 0.9 % (FLUSH) 0.9 %
10 SYRINGE (ML) INJECTION AS NEEDED
Status: DISCONTINUED | OUTPATIENT
Start: 2022-04-13 | End: 2022-04-13 | Stop reason: HOSPADM

## 2022-04-13 RX ORDER — MAGNESIUM CARB/ALUMINUM HYDROX 105-160MG
296 TABLET,CHEWABLE ORAL ONCE
Status: DISCONTINUED | OUTPATIENT
Start: 2022-04-13 | End: 2022-04-13 | Stop reason: HOSPADM

## 2022-04-13 RX ADMIN — IOPAMIDOL 100 ML: 755 INJECTION, SOLUTION INTRAVENOUS at 17:28

## 2022-04-13 RX ADMIN — ALBUTEROL SULFATE 1.25 MG: 1.25 SOLUTION RESPIRATORY (INHALATION) at 15:46

## 2022-04-13 RX ADMIN — HYDROMORPHONE HYDROCHLORIDE 0.5 MG: 1 INJECTION, SOLUTION INTRAMUSCULAR; INTRAVENOUS; SUBCUTANEOUS at 17:22

## 2022-04-13 NOTE — ED NOTES
"Fleet enema attempt with pt on side. Stool noted in rectum that is soft at the sphincter, enema unable to pass around stool.     Pt now states that he had two soap suds enemas while at [facility] where he states \"No liquid or stool was returned.\" During initial assessment pt reported that he never had an enema while admitted to [facility].     Provider notified    "

## 2022-04-13 NOTE — ED PROVIDER NOTES
Subjective       Patient is 77-year-old male comes in complaining of lower abdominal pain for the past 2 days.  Patient states he has not had a bowel movement in 3 weeks.  Patient states he was recently hospitalized at [facility] and was discharged a few days ago.  Patient states that they were supposed to give him an enema there but they did not and he was unable to have a bowel movement throughout his stay.  Patient was hospitalized for several reasons including acute on chronic UTI, possible pneumonia as well as lung mass.  Patient states that they did not do anything about his lung mass.  Patient states he takes pain medicine for chronic pain.  Patient states his abdominal pain is in his left lower quadrant and diffuse to his abdomen and is about an 8 out of 10 pain that is constant nothing seems to make it better or worse.  Patient states he tried to drink a medicine to help his bowels move but states this made him sick and he vomited back up.  Patient denied any fever, chills, chest pain, shortness of breath, cough, or swelling in his legs worse than usual bilaterally.  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    Upon subsequent evaluation, patient had then stated he was able to have a bowel movement while he was at [facility] and also tried an enema with apparent home health yesterday and today with no relief.      Review of Systems   Constitutional: Negative for chills, fatigue and fever.   HENT: Negative for congestion, sore throat, tinnitus and trouble swallowing.    Eyes: Negative for photophobia, discharge and visual disturbance.   Respiratory: Negative for cough, shortness of breath and wheezing.    Cardiovascular: Negative for chest pain, palpitations and leg swelling.   Gastrointestinal: Positive for abdominal pain and constipation. Negative for blood in stool, diarrhea, nausea and vomiting.   Genitourinary: Negative for dysuria, flank pain,  frequency and urgency.   Musculoskeletal: Negative for arthralgias and myalgias.   Skin: Negative for rash.   Neurological: Negative for dizziness, syncope, light-headedness and headaches.   Psychiatric/Behavioral: Negative for confusion.       Past Medical History:   Diagnosis Date   • CHF (congestive heart failure) (HCC)    • COPD (chronic obstructive pulmonary disease) (HCC)    • Coronary artery disease    • GERD (gastroesophageal reflux disease)    • Hypertension        Allergies   Allergen Reactions   • Sulfamethoxazole-Trimethoprim Rash   • Morphine And Related GI Intolerance     Nausea  Nausea         Past Surgical History:   Procedure Laterality Date   • CARDIAC SURGERY         History reviewed. No pertinent family history.    Social History     Socioeconomic History   • Marital status: Unknown   Tobacco Use   • Smoking status: Never Smoker   Substance and Sexual Activity   • Alcohol use: Never   • Drug use: Never   • Sexual activity: Defer           Objective   Physical Exam  Vitals and nursing note reviewed.   Constitutional:       General: He is not in acute distress.     Appearance: He is well-developed. He is not diaphoretic.   HENT:      Head: Normocephalic and atraumatic.      Nose: Nose normal.      Mouth/Throat:      Pharynx: No oropharyngeal exudate.   Eyes:      Extraocular Movements: Extraocular movements intact.      Conjunctiva/sclera: Conjunctivae normal.      Pupils: Pupils are equal, round, and reactive to light.   Cardiovascular:      Rate and Rhythm: Normal rate and regular rhythm.      Heart sounds: Normal heart sounds.      Comments: S1, S2 audible.  Pulmonary:      Effort: Pulmonary effort is normal. No respiratory distress.      Breath sounds: Wheezing (mild) present. No rhonchi or rales.      Comments: On 2 L nasal cannula which is baseline for patient.  Abdominal:      General: Bowel sounds are normal. There is no distension.      Palpations: Abdomen is soft.      Tenderness: There  is generalized abdominal tenderness and tenderness in the left lower quadrant. There is no right CVA tenderness, left CVA tenderness, guarding or rebound. Negative signs include Farrell's sign.   Musculoskeletal:         General: No tenderness or deformity. Normal range of motion.      Cervical back: Normal range of motion.   Skin:     General: Skin is warm.      Capillary Refill: Capillary refill takes less than 2 seconds.      Findings: No erythema or rash.   Neurological:      Mental Status: He is alert and oriented to person, place, and time.      Cranial Nerves: No cranial nerve deficit.   Psychiatric:         Mood and Affect: Mood normal.         Behavior: Behavior normal.         Procedures           ED Course  ED Course as of 04/13/22 2343   Wed Apr 13, 2022   1841 Chart review, patient is currently on Augmentin for 7 days for 10/20/2022.  Patient is also on oxycodone 10 mg up to 4 times a day.  Patient is on aspirin Plavix at home.  Patient takes Colace 3 times a day.  Patient takes Lasix 40 mg twice daily.  Patient is to have an outpatient biopsy of mass of the lung.  Patient has a questionable L5 metastatic lesion about 3 cm in the area on MRI of lumbar spine without contrast concerning for possible metastasis.  Abnormality at L3 as well.  S1 vertebrae could be metastatic.  One half a centimeter as well.  Follow-up with orthopedic spine and interventional radiology for biopsy.  MRI of head was negative for acute ischemia.  Troponin was elevated and trending down and cardiology was consulted and deemed no further cardiac work-up required.  Patient was given enemas as needed and MiraLAX was started and patient subsequently had a bowel movement.  Patient believed to have acute on chronic UTI due to chronic Portillo catheter and was switched to Levaquin upon discharge.  Patient has a history of right lower extremity paralysis and paresis.  Patient has history of COPD on 2 L at baseline nasal cannula.  CAD status  "post CABG.  History of COVID-19 infection in the past.  Patient also has documented history of chronic constipation.  Status post spinal fusion x3. [RL]      ED Course User Index  [RL] Jewel Méndez PA           /60 (BP Location: Right arm, Patient Position: Sitting)   Pulse 101   Temp 98 °F (36.7 °C) (Oral)   Resp 20   Ht 172.7 cm (68\")   Wt 88.9 kg (196 lb)   SpO2 93%   BMI 29.80 kg/m²   Labs Reviewed   COMPREHENSIVE METABOLIC PANEL - Abnormal; Notable for the following components:       Result Value    Glucose 112 (*)     Chloride 93 (*)     CO2 37.0 (*)     All other components within normal limits    Narrative:     GFR Normal >60  Chronic Kidney Disease <60  Kidney Failure <15     URINALYSIS W/ CULTURE IF INDICATED - Abnormal; Notable for the following components:    Specific Gravity, UA 1.034 (*)     Ketones, UA Trace (*)     Protein, UA Trace (*)     Leuk Esterase, UA Moderate (2+) (*)     All other components within normal limits   MAGNESIUM - Abnormal; Notable for the following components:    Magnesium 2.5 (*)     All other components within normal limits   CBC WITH AUTO DIFFERENTIAL - Abnormal; Notable for the following components:    Hemoglobin 11.8 (*)     Hematocrit 35.4 (*)     Lymphocyte % 15.1 (*)     Basophil % 1.8 (*)     All other components within normal limits   URINALYSIS, MICROSCOPIC ONLY - Abnormal; Notable for the following components:    RBC, UA 0-2 (*)     WBC, UA Too Numerous to Count (*)     Squamous Epithelial Cells, UA 7-12 (*)     All other components within normal limits   COVID-19,CEPHEID/DANIEL,COR/OANH/PAD/JACKSON IN-HOUSE,NP SWAB IN TRANSPORT MEDIA 3-4 HR TAT, RT-PCR - Normal    Narrative:     Fact sheet for providers: https://www.fda.gov/media/766800/download     Fact sheet for patients: https://www.fda.gov/media/681690/download  Fact sheet for providers: https://www.fda.gov/media/432212/download    Fact sheet for patients: https://www.fda.gov/media/668353/download    Test " performed by PCR.   LIPASE - Normal   BNP (IN-HOUSE) - Normal    Narrative:     Among patients with dyspnea, NT-proBNP is highly sensitive for the detection of acute congestive heart failure. In addition NT-proBNP of <300 pg/ml effectively rules out acute congestive heart failure with 99% negative predictive value.    Results may be falsely decreased if patient taking Biotin.     AMMONIA - Normal   TROPONIN (IN-HOUSE) - Normal    Narrative:     Troponin T Reference Range:  <= 0.03 ng/mL-   Negative for AMI  >0.03 ng/mL-     Abnormal for myocardial necrosis.  Clinicians would have to utilize clinical acumen, EKG, Troponin and serial changes to determine if it is an Acute Myocardial Infarction or myocardial injury due to an underlying chronic condition.       Results may be falsely decreased if patient taking Biotin.     URINE CULTURE   CBC AND DIFFERENTIAL    Narrative:     The following orders were created for panel order CBC & Differential.  Procedure                               Abnormality         Status                     ---------                               -----------         ------                     CBC Auto Differential[[id number]]        Abnormal            Final result                 Please view results for these tests on the individual orders.     CT Chest With Contrast Diagnostic    Result Date: 4/13/2022  1.A 4.1 cm left perihilar upper lobe mass, highly concerning for malignancy. Recommend tissue sampling. 2.No CT evidence metastatic disease seen in the chest, abdomen, or pelvis. 3. Diffuse colonic distention. Large amount of stool in the colon, greatest in the rectum.  Electronically Signed By-Lara Sheffield MD On:4/13/2022 5:48 PM This report was finalized on [id number] by  Lara Sheffield MD.    CT Abdomen Pelvis With Contrast    Result Date: 4/13/2022  1.A 4.1 cm left perihilar upper lobe mass, highly concerning for malignancy. Recommend tissue sampling. 2.No CT evidence metastatic  disease seen in the chest, abdomen, or pelvis. 3. Diffuse colonic distention. Large amount of stool in the colon, greatest in the rectum.  Electronically Signed By-Lara Sheffield MD On:4/13/2022 5:48 PM This report was finalized on [id number] by  Lara Sheffield MD.    XR Chest 1 View    Result Date: 4/13/2022   New masslike density left midlung. Recommend further evaluation with a CT of the chest, preferably with IV contrast.  Electronically Signed By-Shane Silva On:4/13/2022 4:01 PM This report was finalized on [id number] by  Shane Silva, .                                          MDM  Number of Diagnoses or Management Options     Amount and/or Complexity of Data Reviewed  Clinical lab tests: reviewed  Tests in the radiology section of CPT®: reviewed  Tests in the medicine section of CPT®: reviewed         Chart review:    EKG:  Not indicated  Imaging:    CT Abdomen Pelvis With Contrast   Final Result   1.A 4.1 cm left perihilar upper lobe mass, highly concerning for   malignancy. Recommend tissue sampling.   2.No CT evidence metastatic disease seen in the chest, abdomen, or   pelvis.   3. Diffuse colonic distention. Large amount of stool in the colon,   greatest in the rectum.       Electronically Signed By-Lara Sheffield MD On:4/13/2022 5:48 PM   This report was finalized on [id number] by  Lara Sheffield MD.      CT Chest With Contrast Diagnostic   Final Result   1.A 4.1 cm left perihilar upper lobe mass, highly concerning for   malignancy. Recommend tissue sampling.   2.No CT evidence metastatic disease seen in the chest, abdomen, or   pelvis.   3. Diffuse colonic distention. Large amount of stool in the colon,   greatest in the rectum.       Electronically Signed By-Lara Sheffield MD On:4/13/2022 5:48 PM   This report was finalized on [id number] by  Lara Sheffield MD.      XR Chest 1 View   Final Result       New masslike density left midlung. Recommend further evaluation with a   CT of the  "chest, preferably with IV contrast.       Electronically Signed By-Shane Silva On:4/13/2022 4:01 PM   This report was finalized on [id number] by  Shane Silva, .          Labs: CBC and CMP largely unremarkable for acute findings.  BNP normal.  Ammonia level normal.  Lipase normal.  Initial troponin negative.  COVID-19 swab negative.  UA shows leukocyte Estrace to numerous count WBCs of the squamous epithelial cells of 7-12.     Vitals:  /60 (BP Location: Right arm, Patient Position: Sitting)   Pulse 101   Temp 98 °F (36.7 °C) (Oral)   Resp 20   Ht 172.7 cm (68\")   Wt 88.9 kg (196 lb)   SpO2 93%   BMI 29.80 kg/m²     Medications given:    Medications   albuterol (PROVENTIL) nebulizer solution 0.042% 1.25 mg/3mL (1.25 mg Nebulization Given 4/13/22 1546)   HYDROmorphone (DILAUDID) injection 0.5 mg (0.5 mg Intravenous Given 4/13/22 1722)   iopamidol (ISOVUE-370) 76 % injection 100 mL (100 mL Intravenous Given 4/13/22 1728)       Procedures:    MDM:  Patient is a 77-year-old male comes in complaining of abdominal pain and apparent constipation. CBC and CMP largely unremarkable for acute findings.  BNP normal.  Ammonia level normal.  Lipase normal.  Initial troponin negative.  Patient denied any chest pain or shortness of breath.  COVID-19 swab negative.  UA shows leukocyte Estrace to numerous count WBCs of the squamous epithelial cells of 7-12.  Patient is currently on Levaquin and as a home medicine for her chronic UTIs as well as Augmentin.   Chest x-ray shows new masslike density in left lung.  Recommend further evaluation with CT of the chest with IV contrast.  CT chest abdomen pelvis with contrast shows a 4.1 cm left perihilar upper lobe mass.  Highly concerning for malignancy.  Diffuse colonic distention.  Large amount of stool in the colon.  Greatest in the rectum.  Patient was given Fleet enema for constipation with no relief. Patient was given Dilaudid for pain control with little relief of pain. " Patient was then given soapsuds enema and patient was able to have a bowel movement appropriately.  Patient was offered mag citrate prior to this but patient refused because he states that he took it yesterday and did not help.  Patient states that he has a known mass of his left lung and states he is supposed to follow-up pulmonologist with [facility] but would like a pulmonologist with [facility] to follow-up with instead.  This was provided upon discharge as well as is a oncology provider to follow-up with.  Patient states his abdominal pain completely resolved after he was able to have a bowel movement.  I suspect that the patient's constipation may be related to his chronic pain medicine and this was discussed at length with patient and patient voices understanding.  Patient to follow-up with his pain management provider.  Patient was given strict return precautions and voiced understanding.  Patient voices that he has home health that comes to check on him.  Patient was given an albuterol treatment this patient sounded mildly wheezy on exam.  See full discharge instructions for further details.  Results and plan discussed with patient and is agreeable with plan.    Final diagnoses:   Generalized abdominal pain   Constipation, unspecified constipation type       ED Disposition  ED Disposition     ED Disposition   Discharge    Condition   Stable    Comment   --             [facility] EMERGENCY DEPARTMENT  [address] [address] [phone]  Go in 1 day  As needed, If symptoms worsen    Henry Blanco MD  [address] [facility] [place] [address] [phone]    Call in 1 week  for primary care follow up    Odell Stokes MD  [address] [facility] [facility] [place] [address] [phone]    Schedule an appointment as soon as possible for a visit in 3 days  for lung mass follow up, and biopsy follow up with [facility] pulmonology    Socrates Chau MD  [address] [place]  [address]  [phone]    Schedule an appointment as soon as possible for a visit in 1 week  for follow up with oncology for lung mass with [facility]         Medication List      No changes were made to your prescriptions during this visit.          Jewel Méndez PA  04/13/22 1228

## 2022-04-13 NOTE — ED NOTES
Pt presents to ED via EMS from home with c/o abd pain and nausea. Pt reports constipation for over two weeks. Pt also reports d/c from [facility] 4 days ago. Pt states that he had constipation his entire stay at [facility] and was not provided an enema despite requests for one.

## 2022-04-14 LAB — BACTERIA SPEC AEROBE CULT: NO GROWTH

## 2022-04-14 NOTE — DISCHARGE INSTRUCTIONS
Please take stool softener, laxative and lactulose as previously prescribed for constipation.  Please follow-up with your pain management doctor regarding your narcotic pain medicine as this may be a cause to your constipation.  Please follow-up with pulmonology, whether with [facility] or [facility] concerning lung mass as this is concerning for cancer.  Please come back to the ER if he has severe pain or cannot make a bowel movement as you will need reevaluation that time.

## 2022-04-17 LAB — QT INTERVAL: 383 MS

## 2022-04-22 ENCOUNTER — TRANSCRIBE ORDERS (OUTPATIENT)
Dept: ADMINISTRATIVE | Facility: HOSPITAL | Age: 77
End: 2022-04-22

## 2022-04-22 DIAGNOSIS — R91.8 LUNG MASS: Primary | ICD-10-CM

## 2022-04-25 ENCOUNTER — TRANSCRIBE ORDERS (OUTPATIENT)
Dept: ADMINISTRATIVE | Facility: HOSPITAL | Age: 77
End: 2022-04-25

## 2022-04-25 DIAGNOSIS — R91.1 LUNG NODULE: Primary | ICD-10-CM

## 2022-04-29 ENCOUNTER — HOSPITAL ENCOUNTER (OUTPATIENT)
Dept: CT IMAGING | Facility: HOSPITAL | Age: 77
Discharge: HOME | End: 2022-04-29
Admitting: NURSE PRACTITIONER

## 2022-04-29 ENCOUNTER — TRANSCRIBE ORDERS (OUTPATIENT)
Dept: INTERVENTIONAL RADIOLOGY/VASCULAR | Facility: HOSPITAL | Age: 77
End: 2022-04-29

## 2022-04-29 DIAGNOSIS — R91.1 LUNG NODULE: ICD-10-CM

## 2022-04-29 DIAGNOSIS — Z20.822 ENCOUNTER FOR PREPROCEDURE SCREENING LABORATORY TESTING FOR COVID-19: Primary | ICD-10-CM

## 2022-04-29 DIAGNOSIS — Z01.812 ENCOUNTER FOR PREPROCEDURE SCREENING LABORATORY TESTING FOR COVID-19: Primary | ICD-10-CM

## 2022-04-29 PROCEDURE — 71250 CT THORAX DX C-: CPT

## 2022-04-29 RX ORDER — ASPIRIN 81 MG/1
81 TABLET, CHEWABLE ORAL DAILY
COMMUNITY

## 2022-05-04 ENCOUNTER — LAB (OUTPATIENT)
Dept: LAB | Facility: HOSPITAL | Age: 77
End: 2022-05-04

## 2022-05-04 DIAGNOSIS — Z20.822 ENCOUNTER FOR PREPROCEDURE SCREENING LABORATORY TESTING FOR COVID-19: ICD-10-CM

## 2022-05-04 DIAGNOSIS — Z01.812 ENCOUNTER FOR PREPROCEDURE SCREENING LABORATORY TESTING FOR COVID-19: ICD-10-CM

## 2022-05-04 LAB — SARS-COV-2 ORF1AB RESP QL NAA+PROBE: NOT DETECTED

## 2022-05-04 PROCEDURE — C9803 HOPD COVID-19 SPEC COLLECT: HCPCS

## 2022-05-04 PROCEDURE — U0004 COV-19 TEST NON-CDC HGH THRU: HCPCS

## 2022-05-04 PROCEDURE — U0005 INFEC AGEN DETEC AMPLI PROBE: HCPCS

## 2022-05-05 ENCOUNTER — HOSPITAL ENCOUNTER (OUTPATIENT)
Dept: CT IMAGING | Facility: HOSPITAL | Age: 77
Discharge: HOME | End: 2022-05-05
Admitting: RADIOLOGY

## 2022-05-05 VITALS
OXYGEN SATURATION: 99 % | DIASTOLIC BLOOD PRESSURE: 52 MMHG | BODY MASS INDEX: 28.04 KG/M2 | HEART RATE: 82 BPM | RESPIRATION RATE: 17 BRPM | WEIGHT: 185 LBS | HEIGHT: 68 IN | SYSTOLIC BLOOD PRESSURE: 97 MMHG | TEMPERATURE: 97.9 F

## 2022-05-05 DIAGNOSIS — R91.1 LEFT UPPER LOBE PULMONARY NODULE: ICD-10-CM

## 2022-05-05 LAB
APTT PPP: 22.6 SECONDS (ref 24–31)
BASOPHILS # BLD AUTO: 0.1 10*3/MM3 (ref 0–0.2)
BASOPHILS NFR BLD AUTO: 0.8 % (ref 0–1.5)
DEPRECATED RDW RBC AUTO: 44.6 FL (ref 37–54)
EOSINOPHIL # BLD AUTO: 0.4 10*3/MM3 (ref 0–0.4)
EOSINOPHIL NFR BLD AUTO: 5.8 % (ref 0.3–6.2)
ERYTHROCYTE [DISTWIDTH] IN BLOOD BY AUTOMATED COUNT: 15.2 % (ref 12.3–15.4)
HCT VFR BLD AUTO: 35.1 % (ref 37.5–51)
HGB BLD-MCNC: 11.6 G/DL (ref 13–17.7)
INR PPP: 1.03 (ref 0.93–1.1)
LYMPHOCYTES # BLD AUTO: 1.3 10*3/MM3 (ref 0.7–3.1)
LYMPHOCYTES NFR BLD AUTO: 18.8 % (ref 19.6–45.3)
MCH RBC QN AUTO: 27.3 PG (ref 26.6–33)
MCHC RBC AUTO-ENTMCNC: 33 G/DL (ref 31.5–35.7)
MCV RBC AUTO: 82.7 FL (ref 79–97)
MONOCYTES # BLD AUTO: 0.5 10*3/MM3 (ref 0.1–0.9)
MONOCYTES NFR BLD AUTO: 7.3 % (ref 5–12)
NEUTROPHILS NFR BLD AUTO: 4.6 10*3/MM3 (ref 1.7–7)
NEUTROPHILS NFR BLD AUTO: 67.3 % (ref 42.7–76)
NRBC BLD AUTO-RTO: 0.1 /100 WBC (ref 0–0.2)
PLATELET # BLD AUTO: 150 10*3/MM3 (ref 140–450)
PMV BLD AUTO: 7 FL (ref 6–12)
PROTHROMBIN TIME: 10.6 SECONDS (ref 9.6–11.7)
RBC # BLD AUTO: 4.24 10*6/MM3 (ref 4.14–5.8)
WBC NRBC COR # BLD: 6.9 10*3/MM3 (ref 3.4–10.8)

## 2022-05-05 PROCEDURE — 85025 COMPLETE CBC W/AUTO DIFF WBC: CPT | Performed by: RADIOLOGY

## 2022-05-05 PROCEDURE — 25010000002 ONDANSETRON PER 1 MG: Performed by: RADIOLOGY

## 2022-05-05 PROCEDURE — 85610 PROTHROMBIN TIME: CPT | Performed by: RADIOLOGY

## 2022-05-05 PROCEDURE — 25010000002 MIDAZOLAM PER 1 MG: Performed by: RADIOLOGY

## 2022-05-05 PROCEDURE — 25010000002 FENTANYL CITRATE (PF) 50 MCG/ML SOLUTION: Performed by: RADIOLOGY

## 2022-05-05 PROCEDURE — 76380 CAT SCAN FOLLOW-UP STUDY: CPT

## 2022-05-05 PROCEDURE — 85730 THROMBOPLASTIN TIME PARTIAL: CPT | Performed by: RADIOLOGY

## 2022-05-05 RX ORDER — MIDAZOLAM HYDROCHLORIDE 1 MG/ML
INJECTION INTRAMUSCULAR; INTRAVENOUS
Status: COMPLETED | OUTPATIENT
Start: 2022-05-05 | End: 2022-05-05

## 2022-05-05 RX ORDER — SODIUM CHLORIDE 0.9 % (FLUSH) 0.9 %
10 SYRINGE (ML) INJECTION EVERY 12 HOURS SCHEDULED
Status: DISCONTINUED | OUTPATIENT
Start: 2022-05-05 | End: 2022-05-06 | Stop reason: HOSPADM

## 2022-05-05 RX ORDER — SODIUM CHLORIDE 0.9 % (FLUSH) 0.9 %
10 SYRINGE (ML) INJECTION AS NEEDED
Status: DISCONTINUED | OUTPATIENT
Start: 2022-05-05 | End: 2022-05-06 | Stop reason: HOSPADM

## 2022-05-05 RX ORDER — SODIUM CHLORIDE 9 MG/ML
75 INJECTION, SOLUTION INTRAVENOUS CONTINUOUS
Status: DISCONTINUED | OUTPATIENT
Start: 2022-05-05 | End: 2022-05-06 | Stop reason: HOSPADM

## 2022-05-05 RX ORDER — ONDANSETRON 2 MG/ML
INJECTION INTRAMUSCULAR; INTRAVENOUS
Status: COMPLETED | OUTPATIENT
Start: 2022-05-05 | End: 2022-05-05

## 2022-05-05 RX ORDER — FENTANYL CITRATE 50 UG/ML
INJECTION, SOLUTION INTRAMUSCULAR; INTRAVENOUS
Status: COMPLETED | OUTPATIENT
Start: 2022-05-05 | End: 2022-05-05

## 2022-05-05 RX ADMIN — SODIUM CHLORIDE 75 ML/HR: 9 INJECTION, SOLUTION INTRAVENOUS at 08:10

## 2022-05-05 RX ADMIN — FENTANYL CITRATE 50 MCG: 50 INJECTION, SOLUTION INTRAMUSCULAR; INTRAVENOUS at 09:20

## 2022-05-05 RX ADMIN — MIDAZOLAM 0.5 MG: 1 INJECTION INTRAMUSCULAR; INTRAVENOUS at 09:20

## 2022-05-05 RX ADMIN — Medication 10 ML: at 08:10

## 2022-05-05 RX ADMIN — MIDAZOLAM 0.5 MG: 1 INJECTION INTRAMUSCULAR; INTRAVENOUS at 09:15

## 2022-05-05 RX ADMIN — FENTANYL CITRATE 50 MCG: 50 INJECTION, SOLUTION INTRAMUSCULAR; INTRAVENOUS at 09:15

## 2022-05-05 RX ADMIN — ONDANSETRON 4 MG: 2 INJECTION INTRAMUSCULAR; INTRAVENOUS at 08:57

## 2022-05-05 NOTE — NURSING NOTE
Pt moved by IR staff back onto his stretcher laying semi-fowlers. Pt is awake but drowsy and c/o nausea.

## 2022-05-05 NOTE — NURSING NOTE
Dr. Gould cancelled lung biopsy procedure due to patient not being able to hold still for procedure while on table.

## 2022-05-05 NOTE — NURSING NOTE
Pt brought to CT dept via stretcher, on 2L oxygen via N/C, with face mask in place. Pt is alert and oriented x4. Pt is relaxed and cooperative. Pt skin is flesh, warm, and dry. Pt was educated on left lung biopsy with possible chest tube placement along with medications used for procedure and voiced understanding. Pt is on cardiac monitor and is laying semi-fowlers on his stretcher.

## 2022-05-05 NOTE — NURSING NOTE
Pt moved by IR staff, using slide board, from stretcher onto CT procedure table into supine position, feet first into the scanner. Pt remains on 2L oxygen via N/C and cardiac monitoring.

## 2022-05-05 NOTE — NURSING NOTE
Pt was restless and bringing hands down from over head. Pt unable to hold still. Dr. Gould stopped marking for biopsy.

## 2022-05-05 NOTE — NURSING NOTE
Blood pressure on monitor is malfunctioning. Different monitor to be obtained in order to measure blood pressure. Pt is stable and is verbally responsive to staff.

## 2022-05-05 NOTE — NURSING NOTE
Pt became difficult to arouse and oxygenation was poor to 79%. Pt placed on NRB at 15L and pt was responding to sternal rub to take deep breaths. Oxygenation improved to 92% on 15L NRB within approx one minute. Staff are having to continuously provide sternal rub stimulation to patient. Dr. Gould is in room and aware; no new orders.

## 2022-05-05 NOTE — NURSING NOTE
Received patient back without procedure done.  See MD notes.  Pt sleepy but arousable.  Will discharge when appropriate.

## 2022-05-11 ENCOUNTER — APPOINTMENT (OUTPATIENT)
Dept: PET IMAGING | Facility: HOSPITAL | Age: 77
End: 2022-05-11

## 2022-05-16 ENCOUNTER — HOSPITAL ENCOUNTER (OUTPATIENT)
Dept: PET IMAGING | Facility: HOSPITAL | Age: 77
Discharge: HOME | End: 2022-05-16
Admitting: INTERNAL MEDICINE

## 2022-05-16 DIAGNOSIS — R91.8 LUNG MASS: ICD-10-CM

## 2022-05-16 LAB — GLUCOSE BLDC GLUCOMTR-MCNC: 112 MG/DL (ref 70–105)

## 2022-05-16 PROCEDURE — A9552 F18 FDG: HCPCS | Performed by: INTERNAL MEDICINE

## 2022-05-16 PROCEDURE — 78815 PET IMAGE W/CT SKULL-THIGH: CPT

## 2022-05-16 PROCEDURE — 82962 GLUCOSE BLOOD TEST: CPT

## 2022-05-16 PROCEDURE — 0 FLUDEOXYGLUCOSE F18 SOLUTION: Performed by: INTERNAL MEDICINE

## 2022-05-16 RX ADMIN — FLUDEOXYGLUCOSE F18 1 DOSE: 300 INJECTION INTRAVENOUS at 13:30

## 2022-05-28 ENCOUNTER — HOSPITAL ENCOUNTER (EMERGENCY)
Facility: HOSPITAL | Age: 77
Discharge: HOME | End: 2022-05-28
Attending: EMERGENCY MEDICINE | Admitting: EMERGENCY MEDICINE

## 2022-05-28 ENCOUNTER — APPOINTMENT (OUTPATIENT)
Dept: GENERAL RADIOLOGY | Facility: HOSPITAL | Age: 77
End: 2022-05-28

## 2022-05-28 VITALS
BODY MASS INDEX: 28.04 KG/M2 | RESPIRATION RATE: 18 BRPM | DIASTOLIC BLOOD PRESSURE: 61 MMHG | SYSTOLIC BLOOD PRESSURE: 121 MMHG | HEART RATE: 104 BPM | HEIGHT: 68 IN | TEMPERATURE: 98.7 F | WEIGHT: 185 LBS | OXYGEN SATURATION: 99 %

## 2022-05-28 DIAGNOSIS — N39.0 ACUTE UTI: ICD-10-CM

## 2022-05-28 DIAGNOSIS — J44.1 COPD EXACERBATION: Primary | ICD-10-CM

## 2022-05-28 LAB
ALBUMIN SERPL-MCNC: 3.9 G/DL (ref 3.5–5.2)
ALBUMIN/GLOB SERPL: 1.1 G/DL
ALP SERPL-CCNC: 79 U/L (ref 39–117)
ALT SERPL W P-5'-P-CCNC: 12 U/L (ref 1–41)
ANION GAP SERPL CALCULATED.3IONS-SCNC: 8 MMOL/L (ref 5–15)
AST SERPL-CCNC: 38 U/L (ref 1–40)
B PARAPERT DNA SPEC QL NAA+PROBE: NOT DETECTED
B PERT DNA SPEC QL NAA+PROBE: NOT DETECTED
BACTERIA UR QL AUTO: ABNORMAL /HPF
BASOPHILS # BLD AUTO: 0 10*3/MM3 (ref 0–0.2)
BASOPHILS NFR BLD AUTO: 0.3 % (ref 0–1.5)
BILIRUB SERPL-MCNC: 0.4 MG/DL (ref 0–1.2)
BILIRUB UR QL STRIP: NEGATIVE
BUN SERPL-MCNC: 8 MG/DL (ref 8–23)
BUN/CREAT SERPL: 13.8 (ref 7–25)
C PNEUM DNA NPH QL NAA+NON-PROBE: NOT DETECTED
CALCIUM SPEC-SCNC: 9.3 MG/DL (ref 8.6–10.5)
CHLORIDE SERPL-SCNC: 90 MMOL/L (ref 98–107)
CLARITY UR: ABNORMAL
CO2 SERPL-SCNC: 36 MMOL/L (ref 22–29)
COLOR UR: YELLOW
CREAT SERPL-MCNC: 0.58 MG/DL (ref 0.76–1.27)
DEPRECATED RDW RBC AUTO: 44.2 FL (ref 37–54)
EGFRCR SERPLBLD CKD-EPI 2021: 99.2 ML/MIN/1.73
EOSINOPHIL # BLD AUTO: 0.2 10*3/MM3 (ref 0–0.4)
EOSINOPHIL NFR BLD AUTO: 2.7 % (ref 0.3–6.2)
ERYTHROCYTE [DISTWIDTH] IN BLOOD BY AUTOMATED COUNT: 15.1 % (ref 12.3–15.4)
FLUAV SUBTYP SPEC NAA+PROBE: NOT DETECTED
FLUBV RNA ISLT QL NAA+PROBE: NOT DETECTED
GLOBULIN UR ELPH-MCNC: 3.4 GM/DL
GLUCOSE SERPL-MCNC: 97 MG/DL (ref 65–99)
GLUCOSE UR STRIP-MCNC: NEGATIVE MG/DL
HADV DNA SPEC NAA+PROBE: NOT DETECTED
HCOV 229E RNA SPEC QL NAA+PROBE: NOT DETECTED
HCOV HKU1 RNA SPEC QL NAA+PROBE: NOT DETECTED
HCOV NL63 RNA SPEC QL NAA+PROBE: NOT DETECTED
HCOV OC43 RNA SPEC QL NAA+PROBE: NOT DETECTED
HCT VFR BLD AUTO: 35.7 % (ref 37.5–51)
HGB BLD-MCNC: 11.3 G/DL (ref 13–17.7)
HGB UR QL STRIP.AUTO: ABNORMAL
HMPV RNA NPH QL NAA+NON-PROBE: NOT DETECTED
HPIV1 RNA ISLT QL NAA+PROBE: NOT DETECTED
HPIV2 RNA SPEC QL NAA+PROBE: NOT DETECTED
HPIV3 RNA NPH QL NAA+PROBE: NOT DETECTED
HPIV4 P GENE NPH QL NAA+PROBE: NOT DETECTED
HYALINE CASTS UR QL AUTO: ABNORMAL /LPF
KETONES UR QL STRIP: NEGATIVE
LEUKOCYTE ESTERASE UR QL STRIP.AUTO: ABNORMAL
LIPASE SERPL-CCNC: 13 U/L (ref 13–60)
LYMPHOCYTES # BLD AUTO: 1.1 10*3/MM3 (ref 0.7–3.1)
LYMPHOCYTES NFR BLD AUTO: 14 % (ref 19.6–45.3)
M PNEUMO IGG SER IA-ACNC: NOT DETECTED
MCH RBC QN AUTO: 26.8 PG (ref 26.6–33)
MCHC RBC AUTO-ENTMCNC: 31.8 G/DL (ref 31.5–35.7)
MCV RBC AUTO: 84.4 FL (ref 79–97)
MONOCYTES # BLD AUTO: 0.5 10*3/MM3 (ref 0.1–0.9)
MONOCYTES NFR BLD AUTO: 5.7 % (ref 5–12)
NEUTROPHILS NFR BLD AUTO: 6.3 10*3/MM3 (ref 1.7–7)
NEUTROPHILS NFR BLD AUTO: 77.3 % (ref 42.7–76)
NITRITE UR QL STRIP: POSITIVE
NRBC BLD AUTO-RTO: 0 /100 WBC (ref 0–0.2)
NT-PROBNP SERPL-MCNC: 416.9 PG/ML (ref 0–1800)
PH UR STRIP.AUTO: 8 [PH] (ref 5–8)
PLATELET # BLD AUTO: 162 10*3/MM3 (ref 140–450)
PMV BLD AUTO: 6.9 FL (ref 6–12)
POTASSIUM SERPL-SCNC: 4.6 MMOL/L (ref 3.5–5.2)
PROCALCITONIN SERPL-MCNC: 0.08 NG/ML (ref 0–0.25)
PROT SERPL-MCNC: 7.3 G/DL (ref 6–8.5)
PROT UR QL STRIP: NEGATIVE
RBC # BLD AUTO: 4.23 10*6/MM3 (ref 4.14–5.8)
RBC # UR STRIP: ABNORMAL /HPF
REF LAB TEST METHOD: ABNORMAL
RHINOVIRUS RNA SPEC NAA+PROBE: NOT DETECTED
RSV RNA NPH QL NAA+NON-PROBE: NOT DETECTED
SARS-COV-2 RNA NPH QL NAA+NON-PROBE: NOT DETECTED
SODIUM SERPL-SCNC: 134 MMOL/L (ref 136–145)
SP GR UR STRIP: 1.01 (ref 1–1.03)
SQUAMOUS #/AREA URNS HPF: ABNORMAL /HPF
TROPONIN T SERPL-MCNC: 0.02 NG/ML (ref 0–0.03)
UROBILINOGEN UR QL STRIP: ABNORMAL
WBC # UR STRIP: ABNORMAL /HPF
WBC NRBC COR # BLD: 8.1 10*3/MM3 (ref 3.4–10.8)

## 2022-05-28 PROCEDURE — 85025 COMPLETE CBC W/AUTO DIFF WBC: CPT | Performed by: EMERGENCY MEDICINE

## 2022-05-28 PROCEDURE — 94664 DEMO&/EVAL PT USE INHALER: CPT

## 2022-05-28 PROCEDURE — 84145 PROCALCITONIN (PCT): CPT | Performed by: EMERGENCY MEDICINE

## 2022-05-28 PROCEDURE — 94799 UNLISTED PULMONARY SVC/PX: CPT

## 2022-05-28 PROCEDURE — 84484 ASSAY OF TROPONIN QUANT: CPT | Performed by: EMERGENCY MEDICINE

## 2022-05-28 PROCEDURE — 93005 ELECTROCARDIOGRAM TRACING: CPT | Performed by: EMERGENCY MEDICINE

## 2022-05-28 PROCEDURE — P9612 CATHETERIZE FOR URINE SPEC: HCPCS

## 2022-05-28 PROCEDURE — 71045 X-RAY EXAM CHEST 1 VIEW: CPT

## 2022-05-28 PROCEDURE — 83690 ASSAY OF LIPASE: CPT | Performed by: EMERGENCY MEDICINE

## 2022-05-28 PROCEDURE — 96374 THER/PROPH/DIAG INJ IV PUSH: CPT

## 2022-05-28 PROCEDURE — 25010000002 ONDANSETRON PER 1 MG: Performed by: EMERGENCY MEDICINE

## 2022-05-28 PROCEDURE — 87086 URINE CULTURE/COLONY COUNT: CPT | Performed by: EMERGENCY MEDICINE

## 2022-05-28 PROCEDURE — 0202U NFCT DS 22 TRGT SARS-COV-2: CPT | Performed by: EMERGENCY MEDICINE

## 2022-05-28 PROCEDURE — 83880 ASSAY OF NATRIURETIC PEPTIDE: CPT | Performed by: EMERGENCY MEDICINE

## 2022-05-28 PROCEDURE — 25010000002 METHYLPREDNISOLONE PER 125 MG: Performed by: EMERGENCY MEDICINE

## 2022-05-28 PROCEDURE — 94640 AIRWAY INHALATION TREATMENT: CPT

## 2022-05-28 PROCEDURE — 99284 EMERGENCY DEPT VISIT MOD MDM: CPT

## 2022-05-28 PROCEDURE — 96375 TX/PRO/DX INJ NEW DRUG ADDON: CPT

## 2022-05-28 PROCEDURE — 80053 COMPREHEN METABOLIC PANEL: CPT | Performed by: EMERGENCY MEDICINE

## 2022-05-28 PROCEDURE — 25010000002 CEFTRIAXONE PER 250 MG: Performed by: EMERGENCY MEDICINE

## 2022-05-28 PROCEDURE — 81001 URINALYSIS AUTO W/SCOPE: CPT | Performed by: EMERGENCY MEDICINE

## 2022-05-28 RX ORDER — OXYCODONE HYDROCHLORIDE 5 MG/1
10 TABLET ORAL EVERY 4 HOURS PRN
Status: DISCONTINUED | OUTPATIENT
Start: 2022-05-28 | End: 2022-05-28 | Stop reason: HOSPADM

## 2022-05-28 RX ORDER — ONDANSETRON 2 MG/ML
4 INJECTION INTRAMUSCULAR; INTRAVENOUS ONCE
Status: DISCONTINUED | OUTPATIENT
Start: 2022-05-28 | End: 2022-05-28 | Stop reason: HOSPADM

## 2022-05-28 RX ORDER — ONDANSETRON 2 MG/ML
4 INJECTION INTRAMUSCULAR; INTRAVENOUS ONCE
Status: COMPLETED | OUTPATIENT
Start: 2022-05-28 | End: 2022-05-28

## 2022-05-28 RX ORDER — ALBUTEROL SULFATE 2.5 MG/3ML
2.5 SOLUTION RESPIRATORY (INHALATION) ONCE
Status: COMPLETED | OUTPATIENT
Start: 2022-05-28 | End: 2022-05-28

## 2022-05-28 RX ORDER — METHYLPREDNISOLONE SODIUM SUCCINATE 125 MG/2ML
125 INJECTION, POWDER, LYOPHILIZED, FOR SOLUTION INTRAMUSCULAR; INTRAVENOUS ONCE
Status: COMPLETED | OUTPATIENT
Start: 2022-05-28 | End: 2022-05-28

## 2022-05-28 RX ORDER — CEFDINIR 300 MG/1
300 CAPSULE ORAL 2 TIMES DAILY
Qty: 14 CAPSULE | Refills: 0 | Status: SHIPPED | OUTPATIENT
Start: 2022-05-28 | End: 2022-06-21

## 2022-05-28 RX ORDER — PREDNISONE 20 MG/1
20 TABLET ORAL 2 TIMES DAILY
Qty: 10 TABLET | Refills: 0 | Status: SHIPPED | OUTPATIENT
Start: 2022-05-28

## 2022-05-28 RX ORDER — ONDANSETRON 4 MG/1
4 TABLET, FILM COATED ORAL EVERY 8 HOURS PRN
Qty: 20 TABLET | Refills: 0 | Status: SHIPPED | OUTPATIENT
Start: 2022-05-28

## 2022-05-28 RX ORDER — ALBUTEROL SULFATE 2.5 MG/3ML
2.5 SOLUTION RESPIRATORY (INHALATION)
Status: COMPLETED | OUTPATIENT
Start: 2022-05-28 | End: 2022-05-28

## 2022-05-28 RX ADMIN — ONDANSETRON 4 MG: 2 INJECTION INTRAMUSCULAR; INTRAVENOUS at 05:27

## 2022-05-28 RX ADMIN — CEFTRIAXONE SODIUM 1 G: 10 INJECTION, POWDER, FOR SOLUTION INTRAVENOUS at 06:32

## 2022-05-28 RX ADMIN — ALBUTEROL SULFATE 2.5 MG: 2.5 SOLUTION RESPIRATORY (INHALATION) at 07:07

## 2022-05-28 RX ADMIN — ALBUTEROL SULFATE 2.5 MG: 2.5 SOLUTION RESPIRATORY (INHALATION) at 07:00

## 2022-05-28 RX ADMIN — OXYCODONE 10 MG: 5 TABLET ORAL at 06:32

## 2022-05-28 RX ADMIN — METHYLPREDNISOLONE SODIUM SUCCINATE 125 MG: 125 INJECTION, POWDER, FOR SOLUTION INTRAMUSCULAR; INTRAVENOUS at 05:26

## 2022-05-28 RX ADMIN — ALBUTEROL SULFATE 2.5 MG: 2.5 SOLUTION RESPIRATORY (INHALATION) at 05:06

## 2022-05-29 LAB — BACTERIA SPEC AEROBE CULT: NORMAL

## 2022-06-02 LAB — QT INTERVAL: 357 MS

## 2022-06-09 ENCOUNTER — TRANSCRIBE ORDERS (OUTPATIENT)
Dept: INTERVENTIONAL RADIOLOGY/VASCULAR | Facility: HOSPITAL | Age: 77
End: 2022-06-09

## 2022-06-09 DIAGNOSIS — Z20.822 ENCOUNTER FOR PREPROCEDURE SCREENING LABORATORY TESTING FOR COVID-19: Primary | ICD-10-CM

## 2022-06-09 DIAGNOSIS — Z01.812 ENCOUNTER FOR PREPROCEDURE SCREENING LABORATORY TESTING FOR COVID-19: Primary | ICD-10-CM

## 2022-06-25 ENCOUNTER — LAB (OUTPATIENT)
Dept: LAB | Facility: HOSPITAL | Age: 77
End: 2022-06-25

## 2022-06-25 DIAGNOSIS — Z01.812 ENCOUNTER FOR PREPROCEDURE SCREENING LABORATORY TESTING FOR COVID-19: ICD-10-CM

## 2022-06-25 DIAGNOSIS — Z20.822 ENCOUNTER FOR PREPROCEDURE SCREENING LABORATORY TESTING FOR COVID-19: ICD-10-CM

## 2022-06-25 LAB — SARS-COV-2 ORF1AB RESP QL NAA+PROBE: NOT DETECTED

## 2022-06-25 PROCEDURE — C9803 HOPD COVID-19 SPEC COLLECT: HCPCS

## 2022-06-25 PROCEDURE — U0004 COV-19 TEST NON-CDC HGH THRU: HCPCS

## 2022-06-25 PROCEDURE — U0005 INFEC AGEN DETEC AMPLI PROBE: HCPCS

## 2022-06-28 ENCOUNTER — HOSPITAL ENCOUNTER (OUTPATIENT)
Dept: CT IMAGING | Facility: HOSPITAL | Age: 77
Discharge: HOME | End: 2022-06-28
Admitting: RADIOLOGY

## 2022-06-28 VITALS
BODY MASS INDEX: 30.53 KG/M2 | HEIGHT: 66 IN | TEMPERATURE: 98.2 F | DIASTOLIC BLOOD PRESSURE: 61 MMHG | SYSTOLIC BLOOD PRESSURE: 127 MMHG | WEIGHT: 190 LBS | OXYGEN SATURATION: 99 % | HEART RATE: 99 BPM | RESPIRATION RATE: 22 BRPM

## 2022-06-28 DIAGNOSIS — R91.8 HILAR ENLARGEMENT: ICD-10-CM

## 2022-06-28 LAB
APTT PPP: 27.9 SECONDS (ref 24–31)
BASOPHILS # BLD AUTO: 0 10*3/MM3 (ref 0–0.2)
BASOPHILS NFR BLD AUTO: 0.5 % (ref 0–1.5)
DEPRECATED RDW RBC AUTO: 46.8 FL (ref 37–54)
EOSINOPHIL # BLD AUTO: 0.2 10*3/MM3 (ref 0–0.4)
EOSINOPHIL NFR BLD AUTO: 2.8 % (ref 0.3–6.2)
ERYTHROCYTE [DISTWIDTH] IN BLOOD BY AUTOMATED COUNT: 15.8 % (ref 12.3–15.4)
HCT VFR BLD AUTO: 34.7 % (ref 37.5–51)
HGB BLD-MCNC: 11.3 G/DL (ref 13–17.7)
INR PPP: 1.06 (ref 0.93–1.1)
LYMPHOCYTES # BLD AUTO: 1 10*3/MM3 (ref 0.7–3.1)
LYMPHOCYTES NFR BLD AUTO: 16.4 % (ref 19.6–45.3)
MCH RBC QN AUTO: 27 PG (ref 26.6–33)
MCHC RBC AUTO-ENTMCNC: 32.5 G/DL (ref 31.5–35.7)
MCV RBC AUTO: 83.1 FL (ref 79–97)
MONOCYTES # BLD AUTO: 0.5 10*3/MM3 (ref 0.1–0.9)
MONOCYTES NFR BLD AUTO: 8.1 % (ref 5–12)
NEUTROPHILS NFR BLD AUTO: 4.6 10*3/MM3 (ref 1.7–7)
NEUTROPHILS NFR BLD AUTO: 72.2 % (ref 42.7–76)
NRBC BLD AUTO-RTO: 0 /100 WBC (ref 0–0.2)
PLATELET # BLD AUTO: 179 10*3/MM3 (ref 140–450)
PMV BLD AUTO: 6.4 FL (ref 6–12)
PROTHROMBIN TIME: 10.9 SECONDS (ref 9.6–11.7)
RBC # BLD AUTO: 4.18 10*6/MM3 (ref 4.14–5.8)
WBC NRBC COR # BLD: 6.3 10*3/MM3 (ref 3.4–10.8)

## 2022-06-28 PROCEDURE — 85610 PROTHROMBIN TIME: CPT | Performed by: RADIOLOGY

## 2022-06-28 PROCEDURE — 85730 THROMBOPLASTIN TIME PARTIAL: CPT | Performed by: RADIOLOGY

## 2022-06-28 PROCEDURE — 25010000002 ONDANSETRON PER 1 MG

## 2022-06-28 PROCEDURE — 25010000002 ONDANSETRON PER 1 MG: Performed by: RADIOLOGY

## 2022-06-28 PROCEDURE — 25010000002 MIDAZOLAM PER 1 MG: Performed by: RADIOLOGY

## 2022-06-28 PROCEDURE — 76380 CAT SCAN FOLLOW-UP STUDY: CPT

## 2022-06-28 PROCEDURE — 25010000002 FENTANYL CITRATE (PF) 50 MCG/ML SOLUTION: Performed by: RADIOLOGY

## 2022-06-28 PROCEDURE — 85025 COMPLETE CBC W/AUTO DIFF WBC: CPT | Performed by: RADIOLOGY

## 2022-06-28 PROCEDURE — 25010000002 NALOXONE PER 1 MG: Performed by: RADIOLOGY

## 2022-06-28 RX ORDER — ONDANSETRON 2 MG/ML
INJECTION INTRAMUSCULAR; INTRAVENOUS
Status: COMPLETED | OUTPATIENT
Start: 2022-06-28 | End: 2022-06-28

## 2022-06-28 RX ORDER — FENTANYL CITRATE 50 UG/ML
INJECTION, SOLUTION INTRAMUSCULAR; INTRAVENOUS
Status: COMPLETED | OUTPATIENT
Start: 2022-06-28 | End: 2022-06-28

## 2022-06-28 RX ORDER — MIDAZOLAM HYDROCHLORIDE 1 MG/ML
INJECTION INTRAMUSCULAR; INTRAVENOUS
Status: COMPLETED | OUTPATIENT
Start: 2022-06-28 | End: 2022-06-28

## 2022-06-28 RX ORDER — NALOXONE HYDROCHLORIDE 1 MG/ML
INJECTION INTRAMUSCULAR; INTRAVENOUS; SUBCUTANEOUS
Status: COMPLETED
Start: 2022-06-28 | End: 2022-06-28

## 2022-06-28 RX ORDER — ONDANSETRON 2 MG/ML
INJECTION INTRAMUSCULAR; INTRAVENOUS
Status: COMPLETED
Start: 2022-06-28 | End: 2022-06-28

## 2022-06-28 RX ORDER — NALOXONE HYDROCHLORIDE 0.4 MG/ML
INJECTION, SOLUTION INTRAMUSCULAR; INTRAVENOUS; SUBCUTANEOUS
Status: COMPLETED | OUTPATIENT
Start: 2022-06-28 | End: 2022-06-28

## 2022-06-28 RX ORDER — FLUMAZENIL 0.1 MG/ML
INJECTION INTRAVENOUS
Status: COMPLETED | OUTPATIENT
Start: 2022-06-28 | End: 2022-06-28

## 2022-06-28 RX ORDER — FLUMAZENIL 0.1 MG/ML
INJECTION INTRAVENOUS
Status: COMPLETED
Start: 2022-06-28 | End: 2022-06-28

## 2022-06-28 RX ORDER — SODIUM CHLORIDE 0.9 % (FLUSH) 0.9 %
3 SYRINGE (ML) INJECTION EVERY 12 HOURS SCHEDULED
Status: DISCONTINUED | OUTPATIENT
Start: 2022-06-28 | End: 2022-06-30 | Stop reason: HOSPADM

## 2022-06-28 RX ORDER — SODIUM CHLORIDE 9 MG/ML
75 INJECTION, SOLUTION INTRAVENOUS CONTINUOUS
Status: DISCONTINUED | OUTPATIENT
Start: 2022-06-28 | End: 2022-06-30 | Stop reason: HOSPADM

## 2022-06-28 RX ADMIN — FLUMAZENIL 0.2 MG: 0.1 INJECTION, SOLUTION INTRAVENOUS at 09:28

## 2022-06-28 RX ADMIN — NALOXONE HYDROCHLORIDE 0.4 MG: 1 INJECTION PARENTERAL at 09:28

## 2022-06-28 RX ADMIN — Medication 10 ML: at 07:54

## 2022-06-28 RX ADMIN — ONDANSETRON 4 MG: 2 INJECTION INTRAMUSCULAR; INTRAVENOUS at 08:56

## 2022-06-28 RX ADMIN — FENTANYL CITRATE 50 MCG: 50 INJECTION, SOLUTION INTRAMUSCULAR; INTRAVENOUS at 09:21

## 2022-06-28 RX ADMIN — ONDANSETRON 4 MG: 2 INJECTION INTRAMUSCULAR; INTRAVENOUS at 09:42

## 2022-06-28 RX ADMIN — FENTANYL CITRATE 50 MCG: 50 INJECTION, SOLUTION INTRAMUSCULAR; INTRAVENOUS at 09:25

## 2022-06-28 RX ADMIN — SODIUM CHLORIDE 75 ML/HR: 9 INJECTION, SOLUTION INTRAVENOUS at 07:55

## 2022-06-28 RX ADMIN — NALOXONE HYDROCHLORIDE 0.4 MG: 0.4 INJECTION, SOLUTION INTRAMUSCULAR; INTRAVENOUS; SUBCUTANEOUS at 09:28

## 2022-06-28 RX ADMIN — MIDAZOLAM 0.5 MG: 1 INJECTION INTRAMUSCULAR; INTRAVENOUS at 09:25

## 2022-06-28 RX ADMIN — MIDAZOLAM 0.5 MG: 1 INJECTION INTRAMUSCULAR; INTRAVENOUS at 09:21

## 2022-06-28 NOTE — NURSING NOTE
Pt resting comfortably, sleeping on and off, sitting in fowlers position on stretcher. Pt remains on cardiac monitor and 3L oxygen via N/C.

## 2022-06-28 NOTE — NURSING NOTE
Pt became difficult to arouse and required a sternal rub to arouse and take a breath.  Dr. Conti gave verbal order to reverse pt's conscious sedation.

## 2022-06-28 NOTE — NURSING NOTE
Pt moved back onto his stretcher, sitting in fowlers position. Pt remains on cardiac monitor and is now back on 3L oxygen via N/C.

## 2022-06-28 NOTE — NURSING NOTE
Pt is asleep but easily arouses. Pt remains stable on 3L oxygen via N/C and is on cardiac monitoring.

## 2022-06-28 NOTE — NURSING NOTE
Pt transferred back to post op recovery RN in stable condition. Pt will be prepared for discharge home.

## 2022-06-28 NOTE — NURSING NOTE
Pt brought to CT dept via stretcher, on 3L oxygen via N/C, with face mask in place. Pt is alert and oriented x4. Pt is relaxed and cooperative, but c/o quite a bit of pain to lower back, left hip, and neck; assist with repositioning declined. Pt skin is flesh, warm, and dry. Pt was educated on lung biopsy procedure along with medications used for procedure and voiced understanding. Pt is waiting to speak with MD.

## 2022-06-28 NOTE — NURSING NOTE
Pt sitting in fowlers position on stretcher, continuing to c/o nausea. Pt is back on 3L oxygen via N/C and saturation is stable.

## 2022-06-28 NOTE — NURSING NOTE
Pt is now responding and c/o pain. Pt updated on happenings from medication and was told biopsy was unable to be completed. Pt is somewhat restless. Pt also c/o nausea; suction is readily available and pt is already laying slight right decubitis.